# Patient Record
Sex: MALE | Race: WHITE | Employment: FULL TIME | URBAN - METROPOLITAN AREA
[De-identification: names, ages, dates, MRNs, and addresses within clinical notes are randomized per-mention and may not be internally consistent; named-entity substitution may affect disease eponyms.]

---

## 2017-03-28 ENCOUNTER — HOSPITAL ENCOUNTER (OUTPATIENT)
Age: 52
Setting detail: OBSERVATION
Discharge: HOME | End: 2017-03-29
Attending: EMERGENCY MEDICINE | Admitting: FAMILY MEDICINE
Payer: COMMERCIAL

## 2017-03-28 ENCOUNTER — APPOINTMENT (OUTPATIENT)
Dept: CT IMAGING | Age: 52
End: 2017-03-28
Attending: EMERGENCY MEDICINE
Payer: COMMERCIAL

## 2017-03-28 DIAGNOSIS — O22.30 DVT (DEEP VEIN THROMBOSIS) IN PREGNANCY: ICD-10-CM

## 2017-03-28 DIAGNOSIS — I26.99 OTHER ACUTE PULMONARY EMBOLISM: Primary | ICD-10-CM

## 2017-03-28 DIAGNOSIS — R77.8 ELEVATED TROPONIN: ICD-10-CM

## 2017-03-28 LAB
ALBUMIN SERPL BCP-MCNC: 4.4 G/DL (ref 3.5–5)
ALBUMIN/GLOB SERPL: 1.3 {RATIO} (ref 1.1–2.2)
ALP SERPL-CCNC: 81 U/L (ref 45–117)
ALT SERPL-CCNC: 27 U/L (ref 12–78)
ANION GAP BLD CALC-SCNC: 6 MMOL/L (ref 5–15)
AST SERPL W P-5'-P-CCNC: 19 U/L (ref 15–37)
ATRIAL RATE: 82 BPM
BASOPHILS # BLD AUTO: 0 K/UL (ref 0–0.1)
BASOPHILS # BLD: 0 % (ref 0–1)
BILIRUB SERPL-MCNC: 0.4 MG/DL (ref 0.2–1)
BUN SERPL-MCNC: 24 MG/DL (ref 6–20)
BUN/CREAT SERPL: 22 (ref 12–20)
CALCIUM SERPL-MCNC: 9 MG/DL (ref 8.5–10.1)
CALCULATED P AXIS, ECG09: 22 DEGREES
CALCULATED R AXIS, ECG10: 12 DEGREES
CALCULATED T AXIS, ECG11: 36 DEGREES
CHLORIDE SERPL-SCNC: 103 MMOL/L (ref 97–108)
CK MB CFR SERPL CALC: 1.7 % (ref 0–2.5)
CK MB SERPL-MCNC: 2.2 NG/ML (ref 5–25)
CK SERPL-CCNC: 127 U/L (ref 39–308)
CO2 SERPL-SCNC: 30 MMOL/L (ref 21–32)
CREAT SERPL-MCNC: 1.1 MG/DL (ref 0.7–1.3)
DIAGNOSIS, 93000: NORMAL
EOSINOPHIL # BLD: 0.2 K/UL (ref 0–0.4)
EOSINOPHIL NFR BLD: 3 % (ref 0–7)
ERYTHROCYTE [DISTWIDTH] IN BLOOD BY AUTOMATED COUNT: 12.5 % (ref 11.5–14.5)
GLOBULIN SER CALC-MCNC: 3.4 G/DL (ref 2–4)
GLUCOSE SERPL-MCNC: 91 MG/DL (ref 65–100)
HCT VFR BLD AUTO: 45 % (ref 36.6–50.3)
HGB BLD-MCNC: 15.3 G/DL (ref 12.1–17)
LYMPHOCYTES # BLD AUTO: 21 % (ref 12–49)
LYMPHOCYTES # BLD: 1.8 K/UL (ref 0.8–3.5)
MCH RBC QN AUTO: 31.2 PG (ref 26–34)
MCHC RBC AUTO-ENTMCNC: 34 G/DL (ref 30–36.5)
MCV RBC AUTO: 91.8 FL (ref 80–99)
MONOCYTES # BLD: 0.8 K/UL (ref 0–1)
MONOCYTES NFR BLD AUTO: 9 % (ref 5–13)
NEUTS SEG # BLD: 5.7 K/UL (ref 1.8–8)
NEUTS SEG NFR BLD AUTO: 67 % (ref 32–75)
P-R INTERVAL, ECG05: 166 MS
PLATELET # BLD AUTO: 120 K/UL (ref 150–400)
POTASSIUM SERPL-SCNC: 4.1 MMOL/L (ref 3.5–5.1)
PROT SERPL-MCNC: 7.8 G/DL (ref 6.4–8.2)
Q-T INTERVAL, ECG07: 382 MS
QRS DURATION, ECG06: 100 MS
QTC CALCULATION (BEZET), ECG08: 446 MS
RBC # BLD AUTO: 4.9 M/UL (ref 4.1–5.7)
SODIUM SERPL-SCNC: 139 MMOL/L (ref 136–145)
TROPONIN I SERPL-MCNC: 0.1 NG/ML
TROPONIN I SERPL-MCNC: 0.16 NG/ML
VENTRICULAR RATE, ECG03: 82 BPM
WBC # BLD AUTO: 8.6 K/UL (ref 4.1–11.1)

## 2017-03-28 PROCEDURE — 71275 CT ANGIOGRAPHY CHEST: CPT

## 2017-03-28 PROCEDURE — 99218 HC RM OBSERVATION: CPT

## 2017-03-28 PROCEDURE — 93971 EXTREMITY STUDY: CPT

## 2017-03-28 PROCEDURE — 36415 COLL VENOUS BLD VENIPUNCTURE: CPT | Performed by: FAMILY MEDICINE

## 2017-03-28 PROCEDURE — 93306 TTE W/DOPPLER COMPLETE: CPT

## 2017-03-28 PROCEDURE — 74011636320 HC RX REV CODE- 636/320: Performed by: EMERGENCY MEDICINE

## 2017-03-28 PROCEDURE — 85025 COMPLETE CBC W/AUTO DIFF WBC: CPT | Performed by: EMERGENCY MEDICINE

## 2017-03-28 PROCEDURE — 80053 COMPREHEN METABOLIC PANEL: CPT | Performed by: EMERGENCY MEDICINE

## 2017-03-28 PROCEDURE — 74011000258 HC RX REV CODE- 258: Performed by: EMERGENCY MEDICINE

## 2017-03-28 PROCEDURE — 96372 THER/PROPH/DIAG INJ SC/IM: CPT

## 2017-03-28 PROCEDURE — 82550 ASSAY OF CK (CPK): CPT | Performed by: EMERGENCY MEDICINE

## 2017-03-28 PROCEDURE — 74011250636 HC RX REV CODE- 250/636: Performed by: EMERGENCY MEDICINE

## 2017-03-28 PROCEDURE — 74011250636 HC RX REV CODE- 250/636: Performed by: FAMILY MEDICINE

## 2017-03-28 PROCEDURE — 84484 ASSAY OF TROPONIN QUANT: CPT | Performed by: EMERGENCY MEDICINE

## 2017-03-28 PROCEDURE — 93005 ELECTROCARDIOGRAM TRACING: CPT

## 2017-03-28 PROCEDURE — 96361 HYDRATE IV INFUSION ADD-ON: CPT

## 2017-03-28 PROCEDURE — 99282 EMERGENCY DEPT VISIT SF MDM: CPT

## 2017-03-28 PROCEDURE — 96360 HYDRATION IV INFUSION INIT: CPT

## 2017-03-28 RX ORDER — SODIUM CHLORIDE 0.9 % (FLUSH) 0.9 %
10 SYRINGE (ML) INJECTION
Status: COMPLETED | OUTPATIENT
Start: 2017-03-28 | End: 2017-03-28

## 2017-03-28 RX ORDER — FLUTICASONE PROPIONATE 50 MCG
2 SPRAY, SUSPENSION (ML) NASAL
COMMUNITY
End: 2018-06-04

## 2017-03-28 RX ORDER — SODIUM CHLORIDE 0.9 % (FLUSH) 0.9 %
5-10 SYRINGE (ML) INJECTION EVERY 8 HOURS
Status: DISCONTINUED | OUTPATIENT
Start: 2017-03-28 | End: 2017-03-29 | Stop reason: HOSPADM

## 2017-03-28 RX ORDER — SODIUM CHLORIDE 9 MG/ML
75 INJECTION, SOLUTION INTRAVENOUS CONTINUOUS
Status: DISCONTINUED | OUTPATIENT
Start: 2017-03-28 | End: 2017-03-29

## 2017-03-28 RX ORDER — ACETAMINOPHEN 325 MG/1
650 TABLET ORAL
Status: DISCONTINUED | OUTPATIENT
Start: 2017-03-28 | End: 2017-03-29 | Stop reason: HOSPADM

## 2017-03-28 RX ORDER — SODIUM CHLORIDE 0.9 % (FLUSH) 0.9 %
5-10 SYRINGE (ML) INJECTION AS NEEDED
Status: DISCONTINUED | OUTPATIENT
Start: 2017-03-28 | End: 2017-03-29 | Stop reason: HOSPADM

## 2017-03-28 RX ORDER — MORPHINE SULFATE 2 MG/ML
1 INJECTION, SOLUTION INTRAMUSCULAR; INTRAVENOUS
Status: DISCONTINUED | OUTPATIENT
Start: 2017-03-28 | End: 2017-03-29 | Stop reason: HOSPADM

## 2017-03-28 RX ORDER — ATORVASTATIN CALCIUM 40 MG/1
40 TABLET, FILM COATED ORAL DAILY
Status: DISCONTINUED | OUTPATIENT
Start: 2017-03-29 | End: 2017-03-29 | Stop reason: HOSPADM

## 2017-03-28 RX ORDER — SODIUM CHLORIDE 0.9 % (FLUSH) 0.9 %
10 SYRINGE (ML) INJECTION
Status: CANCELLED | OUTPATIENT
Start: 2017-03-28 | End: 2017-03-28

## 2017-03-28 RX ORDER — ENOXAPARIN SODIUM 100 MG/ML
1 INJECTION SUBCUTANEOUS
Status: COMPLETED | OUTPATIENT
Start: 2017-03-28 | End: 2017-03-28

## 2017-03-28 RX ORDER — ENOXAPARIN SODIUM 100 MG/ML
100 INJECTION SUBCUTANEOUS EVERY 12 HOURS
Status: DISCONTINUED | OUTPATIENT
Start: 2017-03-29 | End: 2017-03-29

## 2017-03-28 RX ORDER — GUAIFENESIN 100 MG/5ML
81 LIQUID (ML) ORAL DAILY
Status: DISCONTINUED | OUTPATIENT
Start: 2017-03-29 | End: 2017-03-29 | Stop reason: HOSPADM

## 2017-03-28 RX ORDER — FLUTICASONE PROPIONATE 50 MCG
2 SPRAY, SUSPENSION (ML) NASAL
Status: DISCONTINUED | OUTPATIENT
Start: 2017-03-28 | End: 2017-03-29 | Stop reason: HOSPADM

## 2017-03-28 RX ADMIN — Medication 10 ML: at 17:37

## 2017-03-28 RX ADMIN — Medication 10 ML: at 22:24

## 2017-03-28 RX ADMIN — ENOXAPARIN SODIUM 100 MG: 100 INJECTION SUBCUTANEOUS at 17:00

## 2017-03-28 RX ADMIN — SODIUM CHLORIDE 100 ML: 900 INJECTION, SOLUTION INTRAVENOUS at 17:38

## 2017-03-28 RX ADMIN — SODIUM CHLORIDE 75 ML/HR: 900 INJECTION, SOLUTION INTRAVENOUS at 19:47

## 2017-03-28 RX ADMIN — IOPAMIDOL 75 ML: 755 INJECTION, SOLUTION INTRAVENOUS at 17:37

## 2017-03-28 NOTE — ED TRIAGE NOTES
Left calf pain x 2 weeks, pt had ddimer done and it was elevated, +sob, +swelling to left lower leg, denies chest pain, +sob with exertion, denies fever, denies n/v

## 2017-03-28 NOTE — PROGRESS NOTES
TRANSFER - IN REPORT:    Verbal report received from Ferdinand Kennedy RN on [employer]  being received from ED for routine progression of care      Report consisted of patients Situation, Background, Assessment and   Recommendations(SBAR). Information from the following report(s) SBAR, Kardex, ED Summary, Intake/Output, MAR, Recent Results and Cardiac Rhythm NSR was reviewed with the receiving nurse. Opportunity for questions and clarification was provided.

## 2017-03-28 NOTE — ED NOTES
Patient in no acute distress, vital signs stable, patient c/o LLE calf pain, denies shortness of breath, remains on RA. On cardiac monitor, cont. Respirations equal and unlabored. Patient reports recent travel to [place], drove there one month ago.

## 2017-03-28 NOTE — ROUTINE PROCESS
TRANSFER - OUT REPORT:    Verbal report given to Milagro Langford RN(name) on [employer]  being transferred to 5 OBS(unit) for routine progression of care       Report consisted of patients Situation, Background, Assessment and   Recommendations(SBAR). Information from the following report(s) SBAR and Recent Results was reviewed with the receiving nurse. Lines:   Peripheral IV 03/28/17 Right Antecubital (Active)   Site Assessment Clean, dry, & intact 3/28/2017  2:29 PM   Phlebitis Assessment 0 3/28/2017  2:29 PM   Infiltration Assessment 0 3/28/2017  2:29 PM   Dressing Status Clean, dry, & intact 3/28/2017  2:29 PM   Dressing Type Transparent 3/28/2017  2:29 PM   Hub Color/Line Status Green;Capped;Flushed;Patent 3/28/2017  2:29 PM   Action Taken Blood drawn 3/28/2017  2:29 PM   Alcohol Cap Used Yes 3/28/2017  2:29 PM        Opportunity for questions and clarification was provided. Patient transported with:   Monitor  Tech    Update: Patient aware of plan of care, VSS, IV patent and capped, in no acute distress, all upon transfer to inpatient unit.

## 2017-03-28 NOTE — PROCEDURES
[facility]  *** FINAL REPORT ***    Name: Janeth Levine  MRN: [id number]  : [birth year] [address] 1965  HIS Order #: [id number]  [id number] [facility] Visit #: [id number]  Date: 28 Mar 2017    TYPE OF TEST: Peripheral Venous Testing    REASON FOR TEST  Limb swelling, Shortness of breath    Left Leg:-  Deep venous thrombosis:           Yes  Proximal extent of thrombus:      Distal Femoral  Superficial venous thrombosis:    No  Deep venous insufficiency:        Not examined  Superficial venous insufficiency: Not examined      INTERPRETATION/FINDINGS  PROCEDURE:  Color duplex ultrasound imaging of lower extremity veins. FINDINGS:       Right: The common femoral vein is patent and without evidence of  thrombus. Phasic flow is observed. This extremity was not otherwise  evaluated. Left:   Consistent with thrombosis involving the distal femoral,  popliteal, soleal, posterior tibial, peroneal and soleal veins as  demonstrated by vein non-compressibility, and by a narrowing or  occlusion of the flow channel on color Doppler imaging. The remaining  segments, common femoral, deep femoral, proximal and mid femoral and  great saphenous are patent and without evidence of thrombus;  each is  fully compressible and there is no narrowing of the flow channel on  color Doppler imaging. Phasic flow is observed in the common femoral  vein. IMPRESSION:  There is evidence of vein thrombosis, as described above. The ultrasound appearance is more consistent with an acute than a  chronic process. ADDITIONAL COMMENTS    I have personally reviewed the data relevant to the interpretation of  this  study.     TECHNOLOGIST: Tio Jones RVT  Signed: 2017 03:12 PM    PHYSICIAN: Birgit Samayoa MD  Signed: 2017 05:49 AM

## 2017-03-28 NOTE — PROGRESS NOTES
Admission Medication Reconciliation:    Information obtained from: Patient    Significant PMH/Disease States:   History reviewed. No pertinent past medical history. Chief Complaint for this Admission:  Leg pain/ SOB    Allergies:  Review of patient's allergies indicates no known allergies. Prior to Admission Medications:   Prior to Admission Medications   Prescriptions Last Dose Informant Patient Reported? Taking?   fluticasone (FLONASE) 50 mcg/actuation nasal spray 3/28/2017 at Unknown time  Yes Yes   Si Sprays by Both Nostrils route daily as needed for Rhinitis. Facility-Administered Medications: None         Comments/Recommendations: Patient just completed a course of cefuroxime yesterday for sinusitis. Added Flonase.

## 2017-03-28 NOTE — ED PROVIDER NOTES
HPI Comments: 45 y.o. male with past medical history significant for knee and shoulder surgery who presents with chief complaint of leg swelling. Pt reports left lower leg pain for the last couple of weeks, and that while climbing a set of stairs yesterday he felt lightheaded, had exertional SOB and palpitations and felt like he was going to pass out. He reports left leg swelling today. He states that yesterday's exercise was not more strenuous than normal. He notes that he drove to [place] one month ago, stopped once each way. He also notes that he finished an ABX course recently. Pt denies hx of DVT, weight changes, cough, wheezing, blood in stool, diarrhea, constipation, hematuria, abdominal pain. There are no other acute medical concerns at this time. Social hx: nonsmoker, works as a   Significant famhx: father had bladder CA. PCP: None    Note written by Tricia Oliveira, as dictated by Sabine Felix MD 2:45 PM      The history is provided by the patient. History reviewed. No pertinent past medical history. Past Surgical History:   Procedure Laterality Date    HX HERNIA REPAIR      HX OTHER SURGICAL      knee surgery, shoulder surgery          History reviewed. No pertinent family history. Social History     Social History    Marital status: SINGLE     Spouse name: N/A    Number of children: N/A    Years of education: N/A     Occupational History    Not on file. Social History Main Topics    Smoking status: Not on file    Smokeless tobacco: Not on file    Alcohol use Not on file    Drug use: Not on file    Sexual activity: Not on file     Other Topics Concern    Not on file     Social History Narrative    No narrative on file         ALLERGIES: Review of patient's allergies indicates no known allergies. Review of Systems   Constitutional: Negative for appetite change, chills and fever. HENT: Negative for congestion.     Eyes: Negative for visual disturbance. Respiratory: Positive for shortness of breath. Negative for cough, chest tightness and wheezing. Cardiovascular: Positive for palpitations. Negative for chest pain. Gastrointestinal: Negative for abdominal pain, blood in stool, constipation, diarrhea and vomiting. Genitourinary: Negative for difficulty urinating, dysuria, frequency and hematuria. Musculoskeletal: Positive for myalgias (left leg). Negative for joint swelling. Left leg swelling   Skin: Negative for rash. Neurological: Positive for light-headedness. Negative for speech difficulty and headaches. All other systems reviewed and are negative. Vitals:    03/28/17 1418 03/28/17 1806   BP: (!) 167/96 145/87   Pulse: (!) 101 88   Resp: 16 16   Temp: 98.1 °F (36.7 °C)    SpO2: 97% 95%   Weight: 101.3 kg (223 lb 4 oz)    Height: 6' 1\" (1.854 m)             Physical Exam   Constitutional: He is oriented to person, place, and time. He appears well-developed and well-nourished. No distress. HENT:   Head: Normocephalic and atraumatic. Nose: Nose normal.   Eyes: Conjunctivae are normal. Pupils are equal, round, and reactive to light. No scleral icterus. Neck: Normal range of motion. Neck supple. No JVD present. No tracheal deviation present. No thyromegaly present. Cardiovascular: Normal rate, regular rhythm and normal heart sounds. No murmur heard. Pulmonary/Chest: Effort normal and breath sounds normal. No respiratory distress. He has no wheezes. He has no rales. Clear lungs   Abdominal: Soft. Bowel sounds are normal. He exhibits no mass. There is no tenderness. There is no rebound and no guarding. Musculoskeletal: Normal range of motion. He exhibits no edema. Left lower leg: He exhibits swelling. Neurological: He is alert and oriented to person, place, and time. No cranial nerve deficit. Coordination normal.   Skin: Skin is warm and dry. No rash noted. He is not diaphoretic. No erythema. Psychiatric: He has a normal mood and affect. His behavior is normal.   Nursing note and vitals reviewed. Note written by Tricia Quintana, as dictated by Sera Goldberg MD 2:45 PM      MDM  Number of Diagnoses or Management Options  DVT (deep vein thrombosis) in pregnancy [facility]): new and requires workup  Elevated troponin: new and requires workup  Other acute pulmonary embolism [facility]): new and requires workup     Amount and/or Complexity of Data Reviewed  Clinical lab tests: ordered and reviewed  Tests in the radiology section of CPT®: ordered and reviewed  Tests in the medicine section of CPT®: ordered and reviewed  Discussion of test results with the performing providers: yes  Decide to obtain previous medical records or to obtain history from someone other than the patient: yes  Review and summarize past medical records: yes  Discuss the patient with other providers: yes  Independent visualization of images, tracings, or specimens: yes    Risk of Complications, Morbidity, and/or Mortality  Presenting problems: high  Diagnostic procedures: high  Management options: high  General comments: Total critical care was 100    Critical Care  Total time providing critical care:  minutes    Patient Progress  Patient progress: stable    ED Course       Procedures    PROGRESS NOTE:  3:09 PM  Will admit to hospitalist for arrhythmia monitoring and likely PE. PROGRESS NOTE:  4:10 PM  Pt has confirmed blood clot in the left leg, with an elevated troponin of 0.16. Will order lovenox. CONSULT NOTE:  4:32 PM Sera Goldberg MD spoke with Dr. Clement Ivy, Consult for Hospitalist.  Discussed available diagnostic tests and clinical findings. Dr. Raymon Mackey will evaluate pt for admission. PROGRESS NOTE:  6:16 PM  Pt has confirmed bilateral PE's by CTA.

## 2017-03-28 NOTE — IP AVS SNAPSHOT
[address] [address] 
[phone] Patient: Cindi Ortega MRN: [id number] JZ You are allergic to the following No active allergies Recent Documentation Height Weight BMI Smoking Status 1.854 m 101.3 kg 29.45 kg/m2 Never Assessed Emergency Contacts Name Discharge Info Relation Home Work Mobile Natalia Dumont About your hospitalization You were admitted on:  2017 You last received care in the:  [facility] 5 OBSERVATION You were discharged on:  2017 Unit phone number:  [phone] Why you were hospitalized Your primary diagnosis was:  Pulmonary Emboli (Hcc) Your diagnoses also included:  Dvt (Deep Vein Thrombosis) In Pregnancy (Hcc), Elevated Troponin Providers Seen During Your Hospitalizations Provider Role Specialty Primary office phone Dakota Crane MD Attending Provider Emergency Medicine [phone] Kellen Nash MD Attending Provider Hospitalist [phone] Angela Sky MD Attending Provider Internal Medicine [phone] Your Primary Care Physician (PCP) Primary Care Physician Office Phone Office Fax NONE ** None ** ** None ** Follow-up Information Follow up With Details Comments Contact Info Phil Heart MD On 4/3/2017 Monday, April 3 at 2 pm.  [address] [facility] [address] [place] [address] 
[phone] Your Appointments 2017  2:00 PM EDT New Patient with Phil Heart MD  
[address] [place] [address] Oncology at [address]) [address] [facility] [address] [place] [address]  
[phone] Current Discharge Medication List  
  
START taking these medications Dose & Instructions Dispensing Information Comments Morning Noon Evening Bedtime  
 apixaban 5 mg tablet Commonly known as:  Alexy Cabrera Your next dose is: Today Take 10mg (2 tabs) twice daily for 7 days then 5mg (1 tab) twice daily until further directed. Quantity:  72 Tab Refills:  0 CONTINUE these medications which have NOT CHANGED Dose & Instructions Dispensing Information Comments Morning Noon Evening Bedtime FLONASE 50 mcg/actuation nasal spray Generic drug:  fluticasone Your next dose is: Today Dose:  2 Spray 2 Sprays by Both Nostrils route daily as needed for Rhinitis. Refills:  0 Where to Get Your Medications Information on where to get these meds will be given to you by the nurse or doctor. ! Ask your nurse or doctor about these medications  
  apixaban 5 mg tablet Discharge Instructions Discharge Instructions PATIENT ID: Praful Moise MRN: [id number] YOB: 1965 DATE OF ADMISSION: 3/28/2017  4:45 PM   
DATE OF DISCHARGE: 3/29/2017 PRIMARY CARE PROVIDER: None ATTENDING PHYSICIAN: Bre Prieto MD 
DISCHARGING PROVIDER: Yuliya Garcia NP To contact this individual call [phone] and ask the  to page. If unavailable ask to be transferred the Adult Hospitalist Department. DISCHARGE DIAGNOSES Pulmonary Embolus, Lower extremity blood clot CONSULTATIONS: IP CONSULT TO HOSPITALIST 
 
PROCEDURES/SURGERIES: * No surgery found * PENDING TEST RESULTS:  
At the time of discharge the following test results are still pending:  none FOLLOW UP APPOINTMENTS:  
Follow-up Information Follow up With Details Comments Contact Info David Triana MD On 4/3/2017 Monday, April 3 at 2 pm.  200 [facility] [address] [place] [address] 
[phone] ADDITIONAL CARE RECOMMENDATIONS:  
1. We have started you on a medication called Eliquis for the blood clots in your lungs and leg.  Please note this is a blood thinner and increases your risk of bleeding. See below for more information to include common side effects. 2. We have set you up with an appointment with a hematologist for an outpatient coagulation work up. Please see above for appointment time on Monday. 3. Recommending you call to schedule to set yourself up with a primary care provider as soon as possible from the list provided to you. DIET: Resume previous ACTIVITY: Activity as tolerated WOUND CARE: none EQUIPMENT needed: none DISCHARGE MEDICATIONS: 
Apixaban (By mouth) Apixaban (a-PIX-a-ban) Treats and prevents blood clots. This medicine is a blood thinner. Brand Name(s):Eliquis There may be other brand names for this medicine. When This Medicine Should Not Be Used: This medicine is not right for everyone. Do not use it if you had an allergic reaction to apixaban or you have active bleeding. How to Use This Medicine:  
Tablet · Your doctor will tell you how much medicine to use. Do not use more than directed. · This medicine should come with a Medication Guide. Ask your pharmacist for a copy if you do not have one. · Missed dose: Take a dose as soon as you remember. If it is almost time for your next dose, wait until then and take a regular dose. Do not take extra medicine to make up for a missed dose. · Store the medicine in a closed container at room temperature, away from heat, moisture, and direct light. Drugs and Foods to Avoid: Ask your doctor or pharmacist before using any other medicine, including over-the-counter medicines, vitamins, and herbal products. · Some medicines can affect how apixaban works. Tell your doctor if you are using any of the following: ¨ Another blood thinner, such as clopidogrel, heparin, prasugrel, warfarin ¨ An NSAID pain or arthritis medicine, such as aspirin, diclofenac, ibuprofen, naproxen, celecoxib ¨ Depression medicine ¨ Infection medicine, such as clarithromycin, itraconazole, ketoconazole, rifampin, ritonavir ¨ Seizure medicine, such as carbamazepine or phenytoin ¨ Berta's wort Warnings While Using This Medicine: · Tell your doctor if you are pregnant or breastfeeding, or if you have kidney disease, liver disease, bleeding problems, or an artificial heart valve. · Do not stop using this medicine suddenly without asking your doctor. You might have a higher risk of stroke for a short time after you stop using this medicine. · This medicine increases your risk for bleeding that can become serious if not controlled. You may also bruise easily, and it may take longer than usual for bleeding to stop. · This medicine may increase your risk for blood clots in your spine or back if you undergo an epidural or spinal puncture. This could lead to paralysis. Tell your doctor if you ever had spine problems or back surgery. · Tell any doctor or dentist who treats you that you are using this medicine. With your doctor's supervision, you may need to stop using this medicine several days before you have surgery or medical tests. · Your doctor will do lab tests at regular visits to check on the effects of this medicine. Keep all appointments. · Keep all medicine out of the reach of children. Never share your medicine with anyone. Possible Side Effects While Using This Medicine:  
Call your doctor right away if you notice any of these side effects: · Allergic reaction: Itching or hives, swelling in your face or hands, swelling or tingling in your mouth or throat, chest tightness, trouble breathing · Change in how much or how often you urinate, red or pink urine · Chest pain, trouble breathing · Coughing up blood, vomiting blood or material that looks like coffee grounds · Numbness, tingling, or muscle weakness in your legs or feet · Red or black, tarry stools · Unusual bleeding, bruising, or weakness If you notice other side effects that you think are caused by this medicine, tell your doctor. Call your doctor for medical advice about side effects. You may report side effects to FDA at 7-508-NIY-1321 © 2016 [address] is for End User's use only and may not be sold, redistributed or otherwise used for commercial purposes. The above information is an  only. It is not intended as medical advice for individual conditions or treatments. Talk to your doctor, nurse or pharmacist before following any medical regimen to see if it is safe and effective for you. See Medication Reconciliation Form · It is important that you take the medication exactly as they are prescribed. · Keep your medication in the bottles provided by the pharmacist and keep a list of the medication names, dosages, and times to be taken in your wallet. · Do not take other medications without consulting your doctor. NOTIFY YOUR PHYSICIAN FOR ANY OF THE FOLLOWING:  
Fever over 101 degrees for 24 hours. Chest pain, shortness of breath, fever, chills, nausea, vomiting, diarrhea, change in mentation, falling, weakness, bleeding. Severe pain or pain not relieved by medications. Or, any other signs or symptoms that you may have questions about. DISPOSITION: 
X  Home With: 
 OT  PT  [facility]  RN  
  
 SNF/Inpatient Rehab/LTAC Independent/assisted living Hospice Other: PROBLEM LIST Updated: Yes X Signed:  
Jitendra Jung NP 
3/29/2017 11:48 AM 
 
 
  
Learning About Deep Vein Thrombosis What is deep vein thrombosis? A deep vein thrombosis (DVT) is a blood clot in certain veins of the legs, pelvis, or arms. The clot is usually in the legs. DVT may damage the vein and cause the area to ache, swell, and change color. DVT also can lead to sores. DVT in these veins needs to be treated because the clots can get bigger, break loose, and travel through the bloodstream to the lungs. A blood clot in a lung can cause death. Blood clots can form in the veins when you are not active for a long period of time. For example, they can form if you need to stay in bed because of a health problem or must sit for a long time on an airplane or in a car. Surgery or an injury can damage your blood vessels and cause a clot to form. Cancer also can cause DVT. And some people have blood that clots too easily, which is a problem that may run in families. A risk factor is something that makes you more likely to develop a disease. Here are some major risk factors for DVT: 
· You have surgery. · You have to stay in bed for more than 3 days (such as in the hospital). · Your blood is likely to clot because of an injury, cancer, or inherited condition. Here are some minor risk factors for DVT: 
· You take birth control hormones. · You are pregnant. · You are in a car or airplane for a long trip. What are the symptoms? Symptoms of DVT may include: · Swelling in the affected area. · Redness and warmth in the affected area. · Pain or tenderness. You may have pain only when you touch the affected area or when you stand or walk. If your doctor thinks you may have DVT, you will probably have an ultrasound test. You may have other tests as well. How can you prevent DVT? · Exercise your lower leg muscles to help blood flow in your legs. Point your toes up toward your head so the calves of your legs are stretched, then relax and repeat. This is a good exercise to do when you are sitting for long periods of time. · Get out of bed as soon as you can after an illness or surgery. If you need to stay in bed, do the leg exercise noted above every hour when you are awake. · Use special stockings called compression stockings. These stockings are tight at the feet with a gradually looser fit on the leg. Many doctors recommend that you wear compression stockings during a journey longer than 8 hours. · Take breaks when you are on long trips. Stop the car and walk around. On long airplane flights, walk up and down the aisle hourly, flex and point your feet every 20 minutes while sitting, and drink plenty of water. · Take blood-thinning medicines after some types of surgery if your doctor recommends it. Blood thinners also may be used if you are likely to develop clots. How is DVT treated? Treatment for DVT usually involves taking blood thinners. These medicines are given through a vein (intravenously, or IV) or as a pill. Talk with your doctor about which medicine is right for you. Your doctor also may suggest that you prop up or elevate your leg when possible, take walks, and wear compression stockings. These measures may help reduce the pain and swelling that can happen with DVT. Follow-up care is a key part of your treatment and safety. Be sure to make and go to all appointments, and call your doctor if you are having problems. It's also a good idea to know your test results and keep a list of the medicines you take. Where can you learn more? Go to http://rc-nabila.info/. Enter A459 in the search box to learn more about \"Learning About Deep Vein Thrombosis. \" Current as of: June 4, 2016 Content Version: 11.2 © 4485-4647 [employer]. Care instructions adapted under license by [employer] (which disclaims liability or warranty for this information). If you have questions about a medical condition or this instruction, always ask your healthcare professional. [address] [place] [address] any warranty or liability for your use of this information. Discharge Orders None [facility] Announcement We are excited to announce that we are making your provider's discharge notes available to you in [employer].   You will see these notes when they are completed and signed by the physician that discharged you from your recent hospital stay. If you have any questions or concerns about any information you see in Redbeacon, please call the Health Information Department where you were seen or reach out to your Primary Care Provider for more information about your plan of care. Introducing [facility]! Cadence Alcantara introduces Redbeacon patient portal. Now you can access parts of your medical record, email your doctor's office, and request medication refills online. 1. In your internet browser, go to [email or web address] 2. Click on the First Time User? Click Here link in the Sign In box. You will see the New Member Sign Up page. 3. Enter your Redbeacon Access Code exactly as it appears below. You will not need to use this code after youve completed the sign-up process. If you do not sign up before the expiration date, you must request a new code. · Redbeacon Access Code: [email or web address] Expires: 6/26/2017  2:50 PM 
 
4. Enter the last four digits of your Social Security Number (xxxx) and Date of Birth (mm/dd/yyyy) as indicated and click Submit. You will be taken to the next sign-up page. 5. Create a Redbeacon ID. This will be your Redbeacon login ID and cannot be changed, so think of one that is secure and easy to remember. 6. Create a Redbeacon password. You can change your password at any time. 7. Enter your Password Reset Question and Answer. This can be used at a later time if you forget your password. 8. Enter your e-mail address. You will receive e-mail notification when new information is available in [address] 9. Click Sign Up. You can now view and download portions of your medical record. 10. Click the Download Summary menu link to download a portable copy of your medical information. If you have questions, please visit the Frequently Asked Questions section of the Redbeacon website. Remember, Redbeacon is NOT to be used for urgent needs. For medical emergencies, dial 911. Now available from your iPhone and Android! General Information Please provide this summary of care documentation to your next provider. Patient Signature:  ____________________________________________________________ Date:  ____________________________________________________________  
  
Burlene Matteo Provider Signature:  ____________________________________________________________ Date:  ____________________________________________________________

## 2017-03-28 NOTE — IP AVS SNAPSHOT
Current Discharge Medication List  
  
START taking these medications Dose & Instructions Dispensing Information Comments Morning Noon Evening Bedtime  
 apixaban 5 mg tablet Commonly known as:  Timmothy Caro Your next dose is: Today Take 10mg (2 tabs) twice daily for 7 days then 5mg (1 tab) twice daily until further directed. Quantity:  72 Tab Refills:  0 CONTINUE these medications which have NOT CHANGED Dose & Instructions Dispensing Information Comments Morning Noon Evening Bedtime FLONASE 50 mcg/actuation nasal spray Generic drug:  fluticasone Your next dose is: Today Dose:  2 Spray 2 Sprays by Both Nostrils route daily as needed for Rhinitis. Refills:  0 Where to Get Your Medications Information on where to get these meds will be given to you by the nurse or doctor. ! Ask your nurse or doctor about these medications  
  apixaban 5 mg tablet

## 2017-03-29 VITALS
WEIGHT: 223.25 LBS | TEMPERATURE: 98.2 F | HEIGHT: 73 IN | SYSTOLIC BLOOD PRESSURE: 134 MMHG | OXYGEN SATURATION: 95 % | HEART RATE: 81 BPM | RESPIRATION RATE: 20 BRPM | BODY MASS INDEX: 29.59 KG/M2 | DIASTOLIC BLOOD PRESSURE: 72 MMHG

## 2017-03-29 LAB
ERYTHROCYTE [DISTWIDTH] IN BLOOD BY AUTOMATED COUNT: 12.4 % (ref 11.5–14.5)
HCT VFR BLD AUTO: 41.8 % (ref 36.6–50.3)
HGB BLD-MCNC: 14.3 G/DL (ref 12.1–17)
MCH RBC QN AUTO: 31.2 PG (ref 26–34)
MCHC RBC AUTO-ENTMCNC: 34.2 G/DL (ref 30–36.5)
MCV RBC AUTO: 91.3 FL (ref 80–99)
PLATELET # BLD AUTO: 126 K/UL (ref 150–400)
RBC # BLD AUTO: 4.58 M/UL (ref 4.1–5.7)
TROPONIN I SERPL-MCNC: 0.06 NG/ML
TROPONIN I SERPL-MCNC: <0.04 NG/ML
WBC # BLD AUTO: 7 K/UL (ref 4.1–11.1)

## 2017-03-29 PROCEDURE — 85027 COMPLETE CBC AUTOMATED: CPT | Performed by: FAMILY MEDICINE

## 2017-03-29 PROCEDURE — 74011250636 HC RX REV CODE- 250/636: Performed by: FAMILY MEDICINE

## 2017-03-29 PROCEDURE — 99218 HC RM OBSERVATION: CPT

## 2017-03-29 PROCEDURE — 74011250637 HC RX REV CODE- 250/637: Performed by: FAMILY MEDICINE

## 2017-03-29 PROCEDURE — 96372 THER/PROPH/DIAG INJ SC/IM: CPT

## 2017-03-29 PROCEDURE — 84484 ASSAY OF TROPONIN QUANT: CPT | Performed by: FAMILY MEDICINE

## 2017-03-29 PROCEDURE — 96361 HYDRATE IV INFUSION ADD-ON: CPT

## 2017-03-29 PROCEDURE — 36415 COLL VENOUS BLD VENIPUNCTURE: CPT | Performed by: FAMILY MEDICINE

## 2017-03-29 RX ADMIN — ENOXAPARIN SODIUM 100 MG: 40 INJECTION SUBCUTANEOUS at 04:45

## 2017-03-29 RX ADMIN — Medication 10 ML: at 04:46

## 2017-03-29 RX ADMIN — SODIUM CHLORIDE 75 ML/HR: 900 INJECTION, SOLUTION INTRAVENOUS at 09:09

## 2017-03-29 RX ADMIN — ASPIRIN 81 MG: 81 TABLET, CHEWABLE ORAL at 09:07

## 2017-03-29 RX ADMIN — SODIUM CHLORIDE 75 ML/HR: 900 INJECTION, SOLUTION INTRAVENOUS at 09:08

## 2017-03-29 NOTE — PROGRESS NOTES
Bedside verbal shift change report given to oncoming nurse Esmeralda Schlatter, R.N. Opportunity for questions and clarifications provided.

## 2017-03-29 NOTE — H&P
[address] [place] [facility] [place] [address]   HISTORY AND PHYSICAL       Name:  Rosmery Luciano   MR#:  [id number]   :  1965   Account #:  [de-identified]        Date of Adm:  2017       CHIEF COMPLAINT: Shortness of breath. HISTORY OF PRESENT ILLNESS: The patient is a 79-year-old   gentleman with past medical history of allergic rhinitis and knee and   shoulder surgery. The patient presents to the hospital complaining of   left leg swelling. The patient reports that he started developing left leg   swelling about 2 weeks back, but did not pay much attention, thought it   was \"muscle strain. \" But yesterday, the patient started having some   pain and discomfort in the leg. The patient also reports yesterday he   was climbing stairs and after reaching the top of the stairs, he felt   extremely dizzy and as if he going to pass out. He felt \"his heart   racing. \" The patient got concerned and decided to come to the   hospital. The patient reports that he drove to [place] about a month   ago, stopped one time each way, and came back to [place]. The   patient reports that he has tinnitus and had some sinus infection for   which he just recently completed antibiotic course. The patient denies   any other complaints or problems. The patient denies any headache,   blurry vision, sore throat, trouble swallowing, trouble with speech, any   abdominal pain, constipation, diarrhea, fever, chills, hematemesis,   melena, hemoptysis, urinary symptoms, focal or generalized   neurological weakness, recent travels or history of blood clots. PAST MEDICAL HISTORY: See above. HOME MEDICATIONS: Currently Flonase. REVIEW OF SYSTEMS: A 10-point review of systems done, which   was essentially negative except for the symptoms mentioned above. ALLERGIES: NO KNOWN DRUG ALLERGIES. FAMILY HISTORY: Was discussed, was found to be noncontributory.    No history of blood clots in the family. PHYSICAL EXAMINATION   VITAL SIGNS: Temperature 98.1, pulse 88, respiratory rate 16, blood   pressure 145/87, pulse oximetry 95% on room air. GENERAL: Alert and oriented x3, awake, no acute distress, resting in   bed, pleasant male, appears to be stated age. HEENT: Pupils equal and reactive to light. Dry mucous membranes. Tympanic membranes clear. NECK: NECK: Supple. CHEST: Clear to auscultation bilaterally. CORONARY: S1, S2 were heard. ABDOMEN: Soft, nontender, nondistended. Bowel sounds are   physiological.   EXTREMITIES: No clubbing, no cyanosis. The left extremity appears   swollen, tender to palpation and Homans sign is positive. NEUROPSYCHIATRIC: Pleasant mood and affect. Sensory grossly   within normal limits. DTR 2+. Strength 5/5. SKIN: Warm. LABORATORY DATA: White count 8.6, hemoglobin 15.3, hematocrit   45, platelets 174. Sodium 139, potassium 4.1, chloride 103,   bicarbonate 30, BUN 24, creatinine 1.10, calcium 9. ALT 27, AST 19,   alkaline phosphatase 81. , CK-MB 2.2. Troponin 0.16. CT of the chest shows bilateral pulmonary emboli. Left lower extremity shows acute left DVT. EKG shows normal sinus rhythm. ASSESSMENT AND PLAN   1. Bilateral pulmonary emboli. The patient will be observed on a   telemetry bed. Will start the patient on Lovenox 1 mg/kg subcutaneous   and continuous pulse oximetry monitoring and oxygen support, and   continue to closely monitor. Will get an echocardiogram, has been   ordered. Further intervention will be per hospital course. Reassess as   needed. The patient may need outpatient hypercoagulable workup. Continue to monitor. 2. Elevated troponin, unclear etiology, most likely secondary to right   heart strain, post pulmonary embolism. Will continue the patient on   aspirin and statin. We will get cardiac enzymes x3 sets. The patient   denies any chest pain at this point of time.  May consider, once echo   results are available, may consider getting a cardiology consult in the   morning, and further intervention will be per hospital course. Will   reassess as needed. The patient will be on telemetry. 3. Left lower extremity deep venous thrombosis. The patient on   Lovenox, pain control. Continue to monitor. 4. Gastrointestinal and deep venous thrombosis prophylaxis. The   patient is on Lovenox. Gastrointestinal prophylaxis not indicated.         MD Yocasta Virk / Sriram Oneal   D:  03/28/2017   18:30   T:  03/28/2017   20:41   Job #:  [id number]

## 2017-03-29 NOTE — DISCHARGE INSTRUCTIONS
Discharge Instructions       PATIENT ID: Rupali Sapp  MRN: [id number]   YOB: 1965    DATE OF ADMISSION: 3/28/2017  4:45 PM    DATE OF DISCHARGE: 3/29/2017    PRIMARY CARE PROVIDER: None     ATTENDING PHYSICIAN: Kiran Gavin MD  DISCHARGING PROVIDER: Sherren Broker, NP    To contact this individual call [phone] and ask the  to page. If unavailable ask to be transferred the Adult Hospitalist Department. DISCHARGE DIAGNOSES Pulmonary Embolus, Lower extremity blood clot     CONSULTATIONS: IP CONSULT TO HOSPITALIST    PROCEDURES/SURGERIES: * No surgery found *    PENDING TEST RESULTS:   At the time of discharge the following test results are still pending:  none    FOLLOW UP APPOINTMENTS:   Follow-up Information     Follow up With Details Comments Contact Info    Edwin Gaviria MD On 4/3/2017 Monday, April 3 at 2 pm.  [facility]  [address]             ADDITIONAL CARE RECOMMENDATIONS:   1. We have started you on a medication called Eliquis for the blood clots in your lungs and leg. Please note this is a blood thinner and increases your risk of bleeding. See below for more information to include common side effects. 2. We have set you up with an appointment with a hematologist for an outpatient coagulation work up. Please see above for appointment time on Monday. 3. Recommending you call to schedule to set yourself up with a primary care provider as soon as possible from the list provided to you. DIET: Resume previous     ACTIVITY: Activity as tolerated    WOUND CARE: none    EQUIPMENT needed: none      DISCHARGE MEDICATIONS:  Apixaban (By mouth)   Apixaban (a-PIX-a-ban)  Treats and prevents blood clots. This medicine is a blood thinner. Brand Name(s):Eliquis   There may be other brand names for this medicine. When This Medicine Should Not Be Used: This medicine is not right for everyone.  Do not use it if you had an allergic reaction to apixaban or you have active bleeding. How to Use This Medicine:   Tablet  · Your doctor will tell you how much medicine to use. Do not use more than directed. · This medicine should come with a Medication Guide. Ask your pharmacist for a copy if you do not have one. · Missed dose: Take a dose as soon as you remember. If it is almost time for your next dose, wait until then and take a regular dose. Do not take extra medicine to make up for a missed dose. · Store the medicine in a closed container at room temperature, away from heat, moisture, and direct light. Drugs and Foods to Avoid:   Ask your doctor or pharmacist before using any other medicine, including over-the-counter medicines, vitamins, and herbal products. · Some medicines can affect how apixaban works. Tell your doctor if you are using any of the following:   ¨ Another blood thinner, such as clopidogrel, heparin, prasugrel, warfarin  ¨ An NSAID pain or arthritis medicine, such as aspirin, diclofenac, ibuprofen, naproxen, celecoxib  ¨ Depression medicine  ¨ Infection medicine, such as clarithromycin, itraconazole, ketoconazole, rifampin, ritonavir  ¨ Seizure medicine, such as carbamazepine or phenytoin  ¨ Berta's wort  Warnings While Using This Medicine:   · Tell your doctor if you are pregnant or breastfeeding, or if you have kidney disease, liver disease, bleeding problems, or an artificial heart valve. · Do not stop using this medicine suddenly without asking your doctor. You might have a higher risk of stroke for a short time after you stop using this medicine. · This medicine increases your risk for bleeding that can become serious if not controlled. You may also bruise easily, and it may take longer than usual for bleeding to stop. · This medicine may increase your risk for blood clots in your spine or back if you undergo an epidural or spinal puncture. This could lead to paralysis.  Tell your doctor if you ever had spine problems or back surgery. · Tell any doctor or dentist who treats you that you are using this medicine. With your doctor's supervision, you may need to stop using this medicine several days before you have surgery or medical tests. · Your doctor will do lab tests at regular visits to check on the effects of this medicine. Keep all appointments. · Keep all medicine out of the reach of children. Never share your medicine with anyone. Possible Side Effects While Using This Medicine:   Call your doctor right away if you notice any of these side effects:  · Allergic reaction: Itching or hives, swelling in your face or hands, swelling or tingling in your mouth or throat, chest tightness, trouble breathing  · Change in how much or how often you urinate, red or pink urine  · Chest pain, trouble breathing  · Coughing up blood, vomiting blood or material that looks like coffee grounds  · Numbness, tingling, or muscle weakness in your legs or feet  · Red or black, tarry stools  · Unusual bleeding, bruising, or weakness  If you notice other side effects that you think are caused by this medicine, tell your doctor. Call your doctor for medical advice about side effects. You may report side effects to FDA at 8-650-FDA-9499  © 2016 [address] is for End User's use only and may not be sold, redistributed or otherwise used for commercial purposes. The above information is an  only. It is not intended as medical advice for individual conditions or treatments. Talk to your doctor, nurse or pharmacist before following any medical regimen to see if it is safe and effective for you. See Medication Reconciliation Form    · It is important that you take the medication exactly as they are prescribed. · Keep your medication in the bottles provided by the pharmacist and keep a list of the medication names, dosages, and times to be taken in your wallet.    · Do not take other medications without consulting your doctor. NOTIFY YOUR PHYSICIAN FOR ANY OF THE FOLLOWING:   Fever over 101 degrees for 24 hours. Chest pain, shortness of breath, fever, chills, nausea, vomiting, diarrhea, change in mentation, falling, weakness, bleeding. Severe pain or pain not relieved by medications. Or, any other signs or symptoms that you may have questions about. DISPOSITION:  X  Home With:   OT  PT  HH  RN       SNF/Inpatient Rehab/LTAC    Independent/assisted living    Hospice    Other:       PROBLEM LIST Updated: Yes X       Signed:   Doyle Azevedo NP  3/29/2017  11:48 AM         Learning About Deep Vein Thrombosis  What is deep vein thrombosis? A deep vein thrombosis (DVT) is a blood clot in certain veins of the legs, pelvis, or arms. The clot is usually in the legs. DVT may damage the vein and cause the area to ache, swell, and change color. DVT also can lead to sores. DVT in these veins needs to be treated because the clots can get bigger, break loose, and travel through the bloodstream to the lungs. A blood clot in a lung can cause death. Blood clots can form in the veins when you are not active for a long period of time. For example, they can form if you need to stay in bed because of a health problem or must sit for a long time on an airplane or in a car. Surgery or an injury can damage your blood vessels and cause a clot to form. Cancer also can cause DVT. And some people have blood that clots too easily, which is a problem that may run in families. A risk factor is something that makes you more likely to develop a disease. Here are some major risk factors for DVT:  · You have surgery. · You have to stay in bed for more than 3 days (such as in the hospital). · Your blood is likely to clot because of an injury, cancer, or inherited condition. Here are some minor risk factors for DVT:  · You take birth control hormones. · You are pregnant.   · You are in a car or airplane for a long trip.  What are the symptoms? Symptoms of DVT may include:  · Swelling in the affected area. · Redness and warmth in the affected area. · Pain or tenderness. You may have pain only when you touch the affected area or when you stand or walk. If your doctor thinks you may have DVT, you will probably have an ultrasound test. You may have other tests as well. How can you prevent DVT? · Exercise your lower leg muscles to help blood flow in your legs. Point your toes up toward your head so the calves of your legs are stretched, then relax and repeat. This is a good exercise to do when you are sitting for long periods of time. · Get out of bed as soon as you can after an illness or surgery. If you need to stay in bed, do the leg exercise noted above every hour when you are awake. · Use special stockings called compression stockings. These stockings are tight at the feet with a gradually looser fit on the leg. Many doctors recommend that you wear compression stockings during a journey longer than 8 hours. · Take breaks when you are on long trips. Stop the car and walk around. On long airplane flights, walk up and down the aisle hourly, flex and point your feet every 20 minutes while sitting, and drink plenty of water. · Take blood-thinning medicines after some types of surgery if your doctor recommends it. Blood thinners also may be used if you are likely to develop clots. How is DVT treated? Treatment for DVT usually involves taking blood thinners. These medicines are given through a vein (intravenously, or IV) or as a pill. Talk with your doctor about which medicine is right for you. Your doctor also may suggest that you prop up or elevate your leg when possible, take walks, and wear compression stockings. These measures may help reduce the pain and swelling that can happen with DVT. Follow-up care is a key part of your treatment and safety.  Be sure to make and go to all appointments, and call your doctor if you are having problems. It's also a good idea to know your test results and keep a list of the medicines you take. Where can you learn more? Go to http://rc-nabila.info/. Enter O004 in the search box to learn more about \"Learning About Deep Vein Thrombosis. \"  Current as of: June 4, 2016  Content Version: 11.2  © 1966-6169 [employer]. Care instructions adapted under license by [facility] (which disclaims liability or warranty for this information). If you have questions about a medical condition or this instruction, always ask your healthcare professional. [place] [address] any warranty or liability for your use of this information.

## 2017-03-29 NOTE — PROGRESS NOTES
Patient is resting in bed he denies having any pain, SOB or discomfort at this time. His left lower calf still has some swelling but no shininess or tightness. His lungs are clear and he is moving air throughout. Will continue to monitor patient for changes in his condition. 0000 patient is resting in bed no complaints at this time. No change in LLE no pain or tenderness at this time. 0430 patient is resting in bed no complaints of pain or discomfort at this time. 0600 patient is resting in bed.    0730 Bedside and Verbal shift change report given to Anitha Mccall RN  (oncoming nurse) by Yelena Galloway RN  (offgoing nurse). Report included the following information SBAR, MAR, Recent Results and Med Rec Status.

## 2017-03-29 NOTE — PROGRESS NOTES
Received consult to determine cost of Eliquis. Pt has [employer] which should have prescription coverage. Would suggest using Outpatient pharmacy at discharge to ensure insurance coverage and address any barriers if exist.    ESEQUIEL Dodson    Provided pt with Eliquis free 30 day card and updated regarding availability of outpt pharmacy. Pt has follow up appointment scheduled on Monday, Dr. Eneida Bolivar and he is exploring new PCP's as well.     ESEQUIEL Katz

## 2017-03-29 NOTE — DISCHARGE SUMMARY
Discharge Summary       PATIENT ID: Asmita Pacheco  MRN: [id number]   YOB: 1965    DATE OF ADMISSION: 3/28/2017  4:45 PM    DATE OF DISCHARGE: 3/29/2017  PRIMARY CARE PROVIDER: None     ATTENDING PHYSICIAN: Vesta Escalera  DISCHARGING PROVIDER: Jitendra Jung NP    To contact this individual call [phone] and ask the  to page. If unavailable ask to be transferred the Adult Hospitalist Department. CONSULTATIONS: IP CONSULT TO HOSPITALIST    PROCEDURES/SURGERIES: * No surgery found *    ADMITTING DIAGNOSES & HOSPITAL COURSE:   Dx: Ameya PE, LLE DVT    45 yom with pmhl of allergic rhinitis and knee and shoulder surgery who presented with left leg swelling. Pt notes swelling and pain started about 2 weeks ago. Patient also reported dizziness with activity. CTA chest + bilateral PE. Dopplers + for LLE DVT. Pt denies any h/o of blood clots or family h/o clotting disorder. Pt notes about 4 weeks ago he drove to [place] and did not get out of the car besides every 5 hours. Echo showed no evidence of heart strain. Patient stable to discharge home. DISCHARGE DIAGNOSES / PLAN:      Bilateral PE with LLE DVT   -lovenox-> changed to Eliquis   -discussed pro and cons of both novel anticoagulants and coumadin, pt choose Eliquis  -pt does not have pcp, list provided for OP follow up   -pt scheduled for appointment with Dr Melanie Lynn on Monday with hematology for OP coag work up    Mildly Elevated Troponin  -0.16 on admission, now 0.04  -denies any chest pain   -echo normal   --EKG NSR       PENDING TEST RESULTS:   At the time of discharge the following test results are still pending: none    FOLLOW UP APPOINTMENTS:    Follow-up Information     Follow up With Details Comments [address], [place] On 4/3/2017 Monday, April 3 at 2 pm.  [address] [place] [address] [place]  [address]  [phone]           ADDITIONAL CARE RECOMMENDATIONS:   1.  We have started you on a medication called Eliquis for the blood clots in your lungs and leg. Please note this is a blood thinner and increases your risk of bleeding. See below for more information to include common side effects. 2. We have set you up with an appointment with a hematologist for an outpatient coagulation work up. Please see above for appointment time on Monday. 3. Recommending you call to schedule to set yourself up with a primary care provider as soon as possible from the list provided to you. DIET: Resume previous     ACTIVITY: Activity as tolerated    WOUND CARE: none    EQUIPMENT needed: none      DISCHARGE MEDICATIONS:  Discharge Medication List as of 3/29/2017 12:05 PM      START taking these medications    Details   apixaban (ELIQUIS) 5 mg tablet Take 10mg (2 tabs) twice daily for 7 days then 5mg (1 tab) twice daily until further directed. , Print, Disp-72 Tab, R-0         CONTINUE these medications which have NOT CHANGED    Details   fluticasone (FLONASE) 50 mcg/actuation nasal spray 2 Sprays by Both Nostrils route daily as needed for Rhinitis., Historical Med               NOTIFY YOUR PHYSICIAN FOR ANY OF THE FOLLOWING:   Fever over 101 degrees for 24 hours. Chest pain, shortness of breath, fever, chills, nausea, vomiting, diarrhea, change in mentation, falling, weakness, bleeding. Severe pain or pain not relieved by medications. Or, any other signs or symptoms that you may have questions about. DISPOSITION:  X  Home With:   OT  PT  HH  RN       Long term SNF/Inpatient Rehab    Independent/assisted living    Hospice    Other:       PATIENT CONDITION AT DISCHARGE:     Functional status    Poor     Deconditioned    X Independent      Cognition    X Lucid     Forgetful     Dementia      Catheters/lines (plus indication)    Nicolas     PICC     PEG    X None      Code status   X  Full code     DNR      PHYSICAL EXAMINATION AT DISCHARGE:  General: No acute distress, cooperative, pleasant   EENT: EOMI. Anicteric sclerae.  Oral mucous moist, oropharynx benign  Resp: CTA bilaterally. No wheezing/rhonchi/rales. No accessory muscle use  CV: Regular rhythm, normal rate, no murmurs, gallops, rubs  GI: Soft, non distended, non tender. normoactive bowel sounds,   Extremities: No edema, warm, 2+ pulses throughout  Neurologic: Moves all extremities. AAOx3, CN II-XII grossly intact  Psych: Good insight. Not anxious nor agitated.   Skin: Good Turgor, no rashes or ulcers      CHRONIC MEDICAL DIAGNOSES:  Problem List as of 3/29/2017  Date Reviewed: 3/29/2017          Codes Class Noted - Resolved    * (Principal)Pulmonary emboli ([address] ICD-10-CM: I26.99  ICD-9-CM: 415.19  3/28/2017 - Present        DVT (deep vein thrombosis) in pregnancy [facility]) ICD-10-CM: O22.30, I82.409  ICD-9-CM: 671.30  3/28/2017 - Present        Elevated troponin ICD-10-CM: R79.89  ICD-9-CM: 790.6  3/28/2017 - Present              Greater than 30 minutes were spent with the patient on counseling and coordination of care    Signed:   Aung Hubbard NP  3/29/2017  11:52 AM

## 2017-04-03 ENCOUNTER — OFFICE VISIT (OUTPATIENT)
Dept: ONCOLOGY | Age: 52
End: 2017-04-03

## 2017-04-03 VITALS
OXYGEN SATURATION: 93 % | RESPIRATION RATE: 18 BRPM | WEIGHT: 223.4 LBS | HEART RATE: 68 BPM | TEMPERATURE: 97.8 F | HEIGHT: 73 IN | DIASTOLIC BLOOD PRESSURE: 90 MMHG | SYSTOLIC BLOOD PRESSURE: 132 MMHG | BODY MASS INDEX: 29.61 KG/M2

## 2017-04-03 DIAGNOSIS — I26.99 OTHER ACUTE PULMONARY EMBOLISM: Primary | ICD-10-CM

## 2017-04-03 DIAGNOSIS — O22.30 DVT (DEEP VEIN THROMBOSIS) IN PREGNANCY: ICD-10-CM

## 2017-04-03 DIAGNOSIS — D69.6 THROMBOCYTOPENIA (HCC): ICD-10-CM

## 2017-04-03 RX ORDER — HYDROCORTISONE 25 MG/G
CREAM TOPICAL 4 TIMES DAILY
Qty: 30 G | Refills: 0 | Status: SHIPPED | OUTPATIENT
Start: 2017-04-03 | End: 2018-06-04

## 2017-04-03 RX ORDER — CEFUROXIME AXETIL 250 MG/1
TABLET ORAL
COMMUNITY
Start: 2017-03-17 | End: 2017-04-03 | Stop reason: ALTCHOICE

## 2017-04-03 NOTE — MR AVS SNAPSHOT
Visit Information Date & Time Provider Department Dept. Phone Encounter #  
 4/3/2017  2:00 PM Disha Holguin  [facility] at [address] [id number] Upcoming Health Maintenance Date Due Hepatitis C Screening 1965 DTaP/Tdap/Td series (1 - Tdap) 6/15/1985 FOBT Q 1 YEAR AGE 50-75 6/15/2014 INFLUENZA AGE 9 TO ADULT 8/1/2016 Allergies as of 4/3/2017  Review Complete On: 4/3/2017 By: Negra Prater LPN No Known Allergies Current Immunizations  Never Reviewed No immunizations on file. Not reviewed this visit Vitals BP Pulse Temp Resp Height(growth percentile) Weight(growth percentile) 132/90 (BP 1 Location: Left arm, BP Patient Position: Sitting) 68 97.8 °F (36.6 °C) (Oral) 18 6' 1\" (1.854 m) 223 lb 6.4 oz (101.3 kg) SpO2 BMI Smoking Status 93% 29.47 kg/m2 Never Smoker Vitals History BMI and BSA Data Body Mass Index Body Surface Area  
 29.47 kg/m 2 2.28 m 2 Preferred Pharmacy Pharmacy Name Phone [facility] [place] [address] [address] [phone] Your Updated Medication List  
  
   
This list is accurate as of: 4/3/17  2:48 PM.  Always use your most recent med list.  
  
  
  
  
 apixaban 5 mg tablet Commonly known as:  Guerrero Ramal Take 10mg (2 tabs) twice daily for 7 days then 5mg (1 tab) twice daily until further directed. FLONASE 50 mcg/actuation nasal spray Generic drug:  fluticasone 2 Sprays by Both Nostrils route daily as needed for Rhinitis. hydrocortisone 2.5 % rectal cream  
Commonly known as:  ANUSOL-HC Insert  into rectum four (4) times daily. Prescriptions Sent to Pharmacy Refills  
 apixaban (ELIQUIS) 5 mg tablet 2 Sig: Take 10mg (2 tabs) twice daily for 7 days then 5mg (1 tab) twice daily until further directed.   
 Class: Normal  
 Pharmacy: [facility] [address], [address]. Ph #: [phone]  
 hydrocortisone (ANUSOL-HC) 2.5 % rectal cream 0 Sig: Insert  into rectum four (4) times daily. Class: Normal  
 Pharmacy: [facility] [address], [address]. Ph #: [phone] Route: Rectal  
  
Introducing [facility] SERVICES! Dear Stella Youssef: Thank you for requesting a [employer] account. Our records indicate that you already have an active [employer] account. You can access your account anytime at [email or web address] Did you know that you can access your hospital and ER discharge instructions at any time in [employer]? You can also review all of your test results from your hospital stay or ER visit. Additional Information If you have questions, please visit the Frequently Asked Questions section of the [employer] website at [email or web address] Remember, [employer] is NOT to be used for urgent needs. For medical emergencies, dial 911. Now available from your iPhone and Android! Please provide this summary of care documentation to your next provider. Your primary care clinician is listed as NONE. If you have any questions after today's visit, please call [phone].

## 2017-04-03 NOTE — PROGRESS NOTES
Hematology Consult    REASON FOR CONSULT: DVT and PE  REQUESTED BY: Dr. Jessy Lopez: Mr. Luciano Craig is a 45 y.o. male with no significant past medical history presented to the ER with left leg swelling on 3/30/17. Lower extremity Dopplers on 3/28/17 consistent with thrombosis involving the distal femoral, popliteal, soleal, posterior tibial, peroneal and soleal veins on the left. CT chest on 3/28/17 demonstrated bilateral pulmonary emboli. CBC was notable for a slightly decreased platelet count of 525X on 3/29/17, CMP was notable for a normal creatinine of 1.1. Patient was started on apixaban 10 mg 2 times daily on 3/30/17    He states that he drove to [place] and back about a month ago. He developed some left leg swelling as this is a reexcision right after his trip, this got progressively worse with pain . He then developed VILLA and dizziness, prompting his visit to the ER. He travels frequently and makes 8 hour trips. He has no personal or family history of blood clotting disorders. At the time of his trip to [place] he was also having 1-2 months of \"head cold\" and some ringing in the ear. He is still having these symptoms. Antihistamines and antibiotics have not helped. Denies any fevers, chills, night sweats, new lumps or bumps, headache, vision changes, abdominal pain. He has not had a colonoscopy. Today for the first time he noticed some blood on the wip after a bowel movement. He has no other bleeding. SOB and swelling have improved, left leg swelling is worse. He rarely uses alcohol, has never smoked. Father had bladder cancer in his 76s, had a cousin with liver cancer.          Past Surgical History:   Procedure Laterality Date    HX HERNIA REPAIR      HX OTHER SURGICAL      knee surgery, shoulder surgery        No Known Allergies    Current Outpatient Prescriptions   Medication Sig Dispense Refill    apixaban (ELIQUIS) 5 mg tablet Take 10mg (2 tabs) twice daily for 7 days then 5mg (1 tab) twice daily until further directed. 72 Tab 0    fluticasone (FLONASE) 50 mcg/actuation nasal spray 2 Sprays by Both Nostrils route daily as needed for Rhinitis. Social History     Social History    Marital status: SINGLE     Spouse name: N/A    Number of children: N/A    Years of education: N/A     Social History Main Topics    Smoking status: Never Smoker    Smokeless tobacco: None    Alcohol use None    Drug use: None    Sexual activity: Not Asked     Other Topics Concern    None     Social History Narrative       No family history on file. ROS  A 12 point review of systems was obtained and is negative except as listed in HPI.     Physical Examination:   Visit Vitals    /90 (BP 1 Location: Left arm, BP Patient Position: Sitting)    Pulse 68    Temp 97.8 °F (36.6 °C) (Oral)    Resp 18    Ht 6' 1\" (1.854 m)    Wt 223 lb 6.4 oz (101.3 kg)    SpO2 93%    BMI 29.47 kg/m2     General appearance - alert, well appearing, and in no distress  Mental status - oriented to person, place, and time  Mouth - mucous membranes moist, pharynx normal without lesions  Neck - supple, no significant adenopathy  Lymphatics - no palpable lymphadenopathy, no hepatosplenomegaly  Chest - clear to auscultation, no wheezes, rales or rhonchi, symmetric air entry  Heart - normal rate, regular rhythm, normal S1, S2, no murmurs, rubs, clicks or gallops  Abdomen - soft, nontender, nondistended, no masses or organomegaly, bowel sounds present  Back exam - full range of motion, no tenderness, palpable spasm or pain on motion  Neurological - normal speech, no focal findings or movement disorder noted  Musculoskeletal - no joint tenderness, deformity or swelling  Extremities - peripheral pulses normal, L calf with wider girth than the R  Skin - normal coloration and turgor, no rashes, no suspicious skin lesions noted    LABS  Lab Results   Component Value Date/Time    WBC 7.0 03/29/2017 02:28 AM HGB 14.3 03/29/2017 02:28 AM    HCT 41.8 03/29/2017 02:28 AM    PLATELET 080 46/81/9392 02:28 AM    MCV 91.3 03/29/2017 02:28 AM    ABS. NEUTROPHILS 5.7 03/28/2017 02:27 PM     Lab Results   Component Value Date/Time    Sodium 139 03/28/2017 02:27 PM    Potassium 4.1 03/28/2017 02:27 PM    Chloride 103 03/28/2017 02:27 PM    CO2 30 03/28/2017 02:27 PM    Glucose 91 03/28/2017 02:27 PM    BUN 24 03/28/2017 02:27 PM    Creatinine 1.10 03/28/2017 02:27 PM    GFR est AA >60 03/28/2017 02:27 PM    GFR est non-AA >60 03/28/2017 02:27 PM    Calcium 9.0 03/28/2017 02:27 PM     Lab Results   Component Value Date/Time    AST (SGOT) 19 03/28/2017 02:27 PM    Alk. phosphatase 81 03/28/2017 02:27 PM    Protein, total 7.8 03/28/2017 02:27 PM    Albumin 4.4 03/28/2017 02:27 PM    Globulin 3.4 03/28/2017 02:27 PM    A-G Ratio 1.3 03/28/2017 02:27 PM       ASSESSMENT  Mr. John Huff is a 45 y.o. male with no significant medical problems was diagnosed with a symptomatic left lower extremity DVT and bilateral symptomatic pulmonary emboli after an 11 hour car ride. Travel of more than 8 hours duration is a modest risk factor for venous thromboembolism,  most commonly associated with airplane travel. Travel associated VTE are events that occur within 4 weeks of travel ( Sudeep MEEK et al. [place] Thrombolysis 2016)  Risk of recurrent VTE provoked by non-surgical factor - 5 percent for the first year; 2.5 percent/year thereafter. Would recommend 3 months of anticoagulation, if patient can limit immobility during travel to less than 3 hours and use compression stockings during travel. Also counseled to pursue age-appropriate cancer screening. Incidental thrombocytopenia during admission could be consumptive during an acute thrombotic episode.     PLAN  Continue with apixaban for a total of 3 months (prescription renewed for 5 mg twice daily after completion of 7 days on 10 mg twice daily)  Screening colonoscopy  Has an appointment with otolaryngology to evaluate persistent nasal congestion and bilateral tingling  No further thrombophilia workup at this time    Return to clinic in 3 months    Marco Rivas MD    Mr. Soni has a reminder for a \"due or due soon\" health maintenance. I have asked that he contact his primary care provider for follow-up on this health maintenance.

## 2017-04-25 PROBLEM — I82.409 DEEP VENOUS THROMBOSIS OF LOWER EXTREMITY (HCC): Status: ACTIVE | Noted: 2017-03-28

## 2017-04-26 ENCOUNTER — TELEPHONE (OUTPATIENT)
Dept: ONCOLOGY | Age: 52
End: 2017-04-26

## 2017-04-26 DIAGNOSIS — I82.402 ACUTE DEEP VEIN THROMBOSIS (DVT) OF LEFT LOWER EXTREMITY, UNSPECIFIED VEIN (HCC): Primary | ICD-10-CM

## 2017-04-26 NOTE — TELEPHONE ENCOUNTER
Per office notes it appears swelling has never fully resolved since diagnosis. Would advise to continue blood thinners, try compression stockings for relief. Will eval with another doppler to make sure clot not worse or new clot has developed on Eliquis. Confirm patient is taking Eliquis as prescribed. May need referral to vascular surgery if pain/swelling not improved.

## 2017-04-26 NOTE — TELEPHONE ENCOUNTER
Patient called stating that the leg that he had the DVT in in swollen and painful. He would like to know if he needs to be seen. Please advise.

## 2017-04-26 NOTE — TELEPHONE ENCOUNTER
Call placed to patient to update on provider recommendations, HIPAA verified. Patient states unable to come out for appt today, however available in the morning. Denies any SOB or severe discomfort. Appt scheduled for 930 am for doppler on 4/27. Patient contacted and states will be at [place] [id number] at [facility] on 4/27. States that he is taking his Eliquis as prescribed and has already purchased compression stockings, which he finds helps immensely.

## 2017-04-27 ENCOUNTER — HOSPITAL ENCOUNTER (OUTPATIENT)
Dept: VASCULAR SURGERY | Age: 52
Discharge: HOME | End: 2017-04-27
Attending: NURSE PRACTITIONER
Payer: COMMERCIAL

## 2017-04-27 DIAGNOSIS — I82.402 ACUTE DEEP VEIN THROMBOSIS (DVT) OF LEFT LOWER EXTREMITY, UNSPECIFIED VEIN (HCC): ICD-10-CM

## 2017-04-27 PROCEDURE — 93971 EXTREMITY STUDY: CPT

## 2017-04-27 NOTE — PROCEDURES
[address]  *** FINAL REPORT ***    Name: Timmy Mason  MRN: [id number]    Outpatient  : [address] 1965  HIS Order #: [id number]  [id number] [facility] Visit #: [id number]  Date: 2017    TYPE OF TEST: Peripheral Venous Testing    REASON FOR TEST  Followup of left femoral, popliteal, gastrocnemius, posterior tibial,  peroneal, and calf soleal DVT found on ultrasound exam of 3/28/2017. Left Leg:-  Deep venous thrombosis:           Yes  Proximal extent of thrombus:      Popliteal Above The Knee  Superficial venous thrombosis:    No  Deep venous insufficiency:        Not examined  Superficial venous insufficiency: Not examined      INTERPRETATION/FINDINGS  PROCEDURE:  Color duplex ultrasound imaging of lower extremity veins. FINDINGS:       Right: The common femoral vein is patent and without evidence of  thrombus. Phasic flow is observed. This extremity was not otherwise  evaluated. Left:   The popliteal and gastrocnemius are not compressible and  no flow is detected by Doppler, consistent with thrombosis (as noted  on exam of 3/28/2017). The common femoral, deep femoral, femoral,  posterior tibial, peroneal, calf soleal, and great saphenous are  patent and without evidence of thrombus;  each is fully compressible  and there is no narrowing of the flow channel on color Doppler  imaging. Phasic flow is observed in the common femoral vein. IMPRESSION:  1. Continued thrombosis of the left popliteal and gastrocnemius veins   as detected on the previous exam done on 3-.  2.  The left distal femoral, posterior tibial, peroneal, and calf  soleal veins (noted to contain thrombus on the previous exam), appear  to be free of thrombus on today's exam.    ADDITIONAL COMMENTS    I have personally reviewed the data relevant to the interpretation of  this  study.     TECHNOLOGIST: Gunnar Dick RVT  Signed: 2017 10:36 AM    PHYSICIAN: Abi Smith MD  Signed: 2017 08:16 AM

## 2017-05-02 ENCOUNTER — OFFICE VISIT (OUTPATIENT)
Dept: ONCOLOGY | Age: 52
End: 2017-05-02

## 2017-05-02 VITALS
HEART RATE: 77 BPM | OXYGEN SATURATION: 100 % | WEIGHT: 224.6 LBS | BODY MASS INDEX: 29.77 KG/M2 | TEMPERATURE: 97 F | HEIGHT: 73 IN | SYSTOLIC BLOOD PRESSURE: 144 MMHG | RESPIRATION RATE: 20 BRPM | DIASTOLIC BLOOD PRESSURE: 85 MMHG

## 2017-05-02 DIAGNOSIS — I87.009 POST-PHLEBITIC SYNDROME: ICD-10-CM

## 2017-05-02 DIAGNOSIS — I26.99 OTHER ACUTE PULMONARY EMBOLISM: ICD-10-CM

## 2017-05-02 DIAGNOSIS — O22.30 DVT (DEEP VEIN THROMBOSIS) IN PREGNANCY: Primary | ICD-10-CM

## 2017-05-02 NOTE — PROGRESS NOTES
Hematology follow up    REASON FOR CONSULT: DVT and PE  REQUESTED BY: Dr. Ashkan Ordaz: Mr. Galileo Shrestha is a 45 y.o. male with no significant past medical history presented to the ER with left leg swelling on 3/30/17. Lower extremity Dopplers on 3/28/17 consistent with thrombosis involving the distal femoral, popliteal, soleal, posterior tibial, peroneal and soleal veins on the left. CT chest on 3/28/17 demonstrated bilateral pulmonary emboli. CBC was notable for a slightly decreased platelet count of 551N on 3/29/17, CMP was notable for a normal creatinine of 1.1. Patient was started on apixaban 10 mg 2 times daily on 3/30/17. He drove to [place] and back about a month prior to the DVT. He developed some left leg swelling right after his trip, this got progressively worse with pain . He then developed VILLA and dizziness, prompting his visit to the ER. He rarely uses alcohol, has never smoked. Father had bladder cancer in his 76s, had a cousin with liver cancer. Due to the provoked nature of the clot, 3 months of Eliquis were recommended. Past Surgical History:   Procedure Laterality Date    HX HERNIA REPAIR      HX OTHER SURGICAL      knee surgery, shoulder surgery        No Known Allergies    Current Outpatient Prescriptions   Medication Sig Dispense Refill    apixaban (ELIQUIS) 5 mg tablet Take 10mg (2 tabs) twice daily for 7 days then 5mg (1 tab) twice daily until further directed. 72 Tab 2    hydrocortisone (ANUSOL-HC) 2.5 % rectal cream Insert  into rectum four (4) times daily. 30 g 0    fluticasone (FLONASE) 50 mcg/actuation nasal spray 2 Sprays by Both Nostrils route daily as needed for Rhinitis.          Social History     Social History    Marital status: SINGLE     Spouse name: N/A    Number of children: N/A    Years of education: N/A     Social History Main Topics    Smoking status: Never Smoker    Smokeless tobacco: None    Alcohol use None    Drug use: None  Sexual activity: Not Asked     Other Topics Concern    None     Social History Narrative       No family history on file. ROS  Today he comes to follow up as L leg swells up intermittently especially after exercise. He had a repeat doppler on 4/27/17 which showed improvement in the LE clot extent. He has been frustrated as he has not been able to go back to his previous level of activity. He still walks 4 miles and bikes. Overall is better with compression sleeve. Has no CP, SOB. Not bleeding. Physical Examination:   Visit Vitals    /85    Pulse 77    Temp 97 °F (36.1 °C)    Resp 20    Ht 6' 1\" (1.854 m)    Wt 224 lb 9.6 oz (101.9 kg)    SpO2 100%    BMI 29.63 kg/m2     General appearance - alert, well appearing, and in no distress  Mental status - oriented to person, place, and time  Mouth - mucous membranes moist, pharynx normal without lesions  Lymphatics - no palpable lymphadenopathy, no hepatosplenomegaly  Chest - clear to auscultation, no wheezes, rales or rhonchi, symmetric air entry  Abdomen - soft, nontender, nondistended, no masses or organomegaly, bowel sounds present  Ext: L leg sleeve, no swelling    LABS  Lab Results   Component Value Date/Time    WBC 7.0 03/29/2017 02:28 AM    HGB 14.3 03/29/2017 02:28 AM    HCT 41.8 03/29/2017 02:28 AM    PLATELET 024 10/02/9385 02:28 AM    MCV 91.3 03/29/2017 02:28 AM    ABS. NEUTROPHILS 5.7 03/28/2017 02:27 PM     Lab Results   Component Value Date/Time    Sodium 139 03/28/2017 02:27 PM    Potassium 4.1 03/28/2017 02:27 PM    Chloride 103 03/28/2017 02:27 PM    CO2 30 03/28/2017 02:27 PM    Glucose 91 03/28/2017 02:27 PM    BUN 24 03/28/2017 02:27 PM    Creatinine 1.10 03/28/2017 02:27 PM    GFR est AA >60 03/28/2017 02:27 PM    GFR est non-AA >60 03/28/2017 02:27 PM    Calcium 9.0 03/28/2017 02:27 PM     Lab Results   Component Value Date/Time    AST (SGOT) 19 03/28/2017 02:27 PM    Alk.  phosphatase 81 03/28/2017 02:27 PM    Protein, total 7.8 03/28/2017 02:27 PM    Albumin 4.4 03/28/2017 02:27 PM    Globulin 3.4 03/28/2017 02:27 PM    A-G Ratio 1.3 03/28/2017 02:27 PM     Duplex 3/28/17  Left: Consistent with thrombosis involving the distal femoral,  popliteal, soleal, posterior tibial, peroneal and soleal veins as  demonstrated by vein non-compressibility, and by a narrowing or  occlusion of the flow channel on color Doppler imaging. The remaining  segments, common femoral, deep femoral, proximal and mid femoral and  great saphenous are patent and without evidence of thrombus; each is  fully compressible and there is no narrowing of the flow channel on  color Doppler imaging. Phasic flow is observed in the common femoral  Vein. Duplex 4/27/17  1. Continued thrombosis of the left popliteal and gastrocnemius veins  as detected on the previous exam done on 3-.  2. The left distal femoral, posterior tibial, peroneal, and calf  soleal veins (noted to contain thrombus on the previous exam), appear  to be free of thrombus on today's exam.    ASSESSMENT  Mr. Glo Covarrubias is a 45 y.o. male with no significant medical problems was diagnosed with a symptomatic left lower extremity DVT and bilateral symptomatic pulmonary emboli after an 11 hour car ride on 3/28/17. Travel of more than 8 hours duration is a modest risk factor for venous thromboembolism,  most commonly associated with airplane travel. Travel associated VTE are events that occur within 4 weeks of travel ( Sudeep MEEK et al. [place] Thrombolysis 2016)  Risk of recurrent VTE provoked by non-surgical factor  5 percent for the first year; 2.5 percent/year thereafter. Recommended 3 months of anticoagulation, if patient can limit immobility during travel to less than 3 hours and use compression stockings during travel. Also counseled to pursue age-appropriate cancer screening. He has had some exercise induced post phlebitic edema.  Repeat Duplex shows resolution of the majority of thrombus through popliteal and gastrocnemius have persistent thrombus. Has no extension. Due to the extent of the clot, may take longer to [facility] and also leave him with some post phlebitic syndrome. Reassured that Eliquis is effective. If symptoms and thrombus persist at 3 months may continue for a total of 6 monmths    PLAN  Continue with apixaban for a total of 3 months (Finishes June 2017)   Screening colonoscopy  Duplex of LLE after completion of 3 months of AC    Return in June 2017 as scheduled    Eloise Gallegos MD    Mr. Soni has a reminder for a \"due or due soon\" health maintenance. I have asked that he contact his primary care provider for follow-up on this health maintenance.

## 2017-05-11 PROBLEM — I82.4Z2 ACUTE EMBOLISM AND THROMBOSIS OF DEEP VEIN OF LEFT DISTAL LOWER EXTREMITY (HCC): Status: ACTIVE | Noted: 2017-05-02

## 2017-06-26 ENCOUNTER — HOSPITAL ENCOUNTER (OUTPATIENT)
Dept: VASCULAR SURGERY | Age: 52
Discharge: HOME | End: 2017-06-26
Attending: INTERNAL MEDICINE
Payer: COMMERCIAL

## 2017-06-26 DIAGNOSIS — O22.30 DVT (DEEP VEIN THROMBOSIS) IN PREGNANCY: ICD-10-CM

## 2017-06-26 PROCEDURE — 93971 EXTREMITY STUDY: CPT

## 2017-06-26 NOTE — PROCEDURES
[facility] AT [place]  *** FINAL REPORT ***    Name: Tommy Brooks  MRN: [id number]    Outpatient  : [address] 1965  HIS Order #: [id number]  [id number] [facility] Visit #: [id number]  Date: 2017    TYPE OF TEST: Peripheral Venous Testing    REASON FOR TEST  Left leg swelling. History of left lower extremity DVT found on  ultrasound exam on 3/29/2017. Most recent exam on 2017 showed  continued thrombosis of the left popliteal and gastrocnemius veins. Left Leg:-  Deep venous thrombosis:           Yes  Proximal extent of thrombus:      Popliteal Above The Knee  Superficial venous thrombosis:    No  Deep venous insufficiency:        Not examined  Superficial venous insufficiency: Not examined      INTERPRETATION/FINDINGS  PROCEDURE:  Color duplex ultrasound imaging of lower extremity veins. FINDINGS:       Right: The common femoral vein is patent and without evidence of  thrombus. Phasic flow is observed. This extremity was not otherwise  evaluated. Left:   The popliteal and gastrocnemius are not compressible and  there are low to medium level intraluminal echo's, consistent with  DVT. The popliteal does demonstrate some flow with flow augmentation  maneuvers, but no spontaneous flow is detected by Doppler and there is   substantial narrowing of the color Doppler flow channel. No flow is  detected in the gastrocnemius. The common femoral, deep femoral,  femoral, posterior tibial, peroneal, and great saphenous are patent  and without evidence of thrombus;  each is fully compressible and  there is no narrowing of the flow channel on color Doppler imaging. Phasic flow is observed in the common femoral vein. IMPRESSION:  There is continued evidence of left lower extremity DVT  involving the popliteal and gastrocnemius veins.   The popliteal is  partially recanalized, which is some improvement over the previous  exam done on 2017, however, there still appears to be significant   narrowing of the flow channel. The gastrocnemius still appears to be   occluded. ADDITIONAL COMMENTS    I have personally reviewed the data relevant to the interpretation of  this  study.     TECHNOLOGIST: Kal King RVT  Signed: 06/26/2017 02:09 PM    PHYSICIAN: Kelly Flores MD  Signed: 06/28/2017 07:57 AM

## 2017-06-30 ENCOUNTER — OFFICE VISIT (OUTPATIENT)
Dept: ONCOLOGY | Age: 52
End: 2017-06-30

## 2017-06-30 VITALS
SYSTOLIC BLOOD PRESSURE: 144 MMHG | TEMPERATURE: 97.6 F | RESPIRATION RATE: 20 BRPM | DIASTOLIC BLOOD PRESSURE: 88 MMHG | BODY MASS INDEX: 29.77 KG/M2 | HEIGHT: 73 IN | OXYGEN SATURATION: 97 % | HEART RATE: 77 BPM | WEIGHT: 224.6 LBS

## 2017-06-30 DIAGNOSIS — I82.532 CHRONIC DEEP VEIN THROMBOSIS (DVT) OF POPLITEAL VEIN OF LEFT LOWER EXTREMITY (HCC): Primary | ICD-10-CM

## 2017-06-30 DIAGNOSIS — I87.009 POST-PHLEBITIC SYNDROME: ICD-10-CM

## 2017-06-30 NOTE — PROGRESS NOTES
Hematology follow up    REASON FOR CONSULT: DVT and PE  REQUESTED BY: Dr. Tutu Vaughan: Mr. Monique Ariza is a 47 y.o. male with no significant past medical history presented to the ER with left leg swelling on 3/30/17. Lower extremity Dopplers on 3/28/17 consistent with thrombosis involving the distal femoral, popliteal, soleal, posterior tibial, peroneal and soleal veins on the left. CT chest on 3/28/17 demonstrated bilateral pulmonary emboli. CBC was notable for a slightly decreased platelet count of 564D on 3/29/17, CMP was notable for a normal creatinine of 1.1. Patient was started on apixaban 10 mg 2 times daily on 3/30/17. He drove to [place] and back about a month prior to the DVT. He developed some left leg swelling right after his trip, this got progressively worse with pain . He then developed VILLA and dizziness, prompting his visit to the ER. He rarely uses alcohol, has never smoked. Father had bladder cancer in his 76s, had a cousin with liver cancer. Due to the provoked nature of the clot, 3 months of Eliquis were recommended. Past Surgical History:   Procedure Laterality Date    HX HERNIA REPAIR      HX OTHER SURGICAL      knee surgery, shoulder surgery        No Known Allergies    Current Outpatient Prescriptions   Medication Sig Dispense Refill    apixaban (ELIQUIS) 5 mg tablet Take 10mg (2 tabs) twice daily for 7 days then 5mg (1 tab) twice daily until further directed. 72 Tab 2    hydrocortisone (ANUSOL-HC) 2.5 % rectal cream Insert  into rectum four (4) times daily. 30 g 0    fluticasone (FLONASE) 50 mcg/actuation nasal spray 2 Sprays by Both Nostrils route daily as needed for Rhinitis.          Social History     Social History    Marital status: SINGLE     Spouse name: N/A    Number of children: N/A    Years of education: N/A     Social History Main Topics    Smoking status: Never Smoker    Smokeless tobacco: Not on file    Alcohol use Not on file   Vanessa Evans Drug use: Not on file    Sexual activity: Not on file     Other Topics Concern    Not on file     Social History Narrative       No family history on file. ROS  Today he comes to follow up after 3 months of AC. Left leg still intermittently swells up. Pain is improved. Has no CP, SOB. Not bleeding. Physical Examination:   Visit Vitals    Ht 6' 1\" (1.854 m)    Wt 224 lb 9.6 oz (101.9 kg)    BMI 29.63 kg/m2     General appearance - alert, well appearing, and in no distress  Mental status - oriented to person, place, and time  Mouth - mucous membranes moist, pharynx normal without lesions  Lymphatics - no palpable lymphadenopathy, no hepatosplenomegaly  Chest - clear to auscultation, no wheezes, rales or rhonchi, symmetric air entry  Abdomen - soft, nontender, nondistended, no masses or organomegaly, bowel sounds present  Ext: L leg sleeve, no swelling    LABS  Lab Results   Component Value Date/Time    WBC 7.0 03/29/2017 02:28 AM    HGB 14.3 03/29/2017 02:28 AM    HCT 41.8 03/29/2017 02:28 AM    PLATELET 343 97/99/5360 02:28 AM    MCV 91.3 03/29/2017 02:28 AM    ABS. NEUTROPHILS 5.7 03/28/2017 02:27 PM     Lab Results   Component Value Date/Time    Sodium 139 03/28/2017 02:27 PM    Potassium 4.1 03/28/2017 02:27 PM    Chloride 103 03/28/2017 02:27 PM    CO2 30 03/28/2017 02:27 PM    Glucose 91 03/28/2017 02:27 PM    BUN 24 03/28/2017 02:27 PM    Creatinine 1.10 03/28/2017 02:27 PM    GFR est AA >60 03/28/2017 02:27 PM    GFR est non-AA >60 03/28/2017 02:27 PM    Calcium 9.0 03/28/2017 02:27 PM     Lab Results   Component Value Date/Time    AST (SGOT) 19 03/28/2017 02:27 PM    Alk.  phosphatase 81 03/28/2017 02:27 PM    Protein, total 7.8 03/28/2017 02:27 PM    Albumin 4.4 03/28/2017 02:27 PM    Globulin 3.4 03/28/2017 02:27 PM    A-G Ratio 1.3 03/28/2017 02:27 PM     Duplex 3/28/17  Left: Consistent with thrombosis involving the distal femoral,  popliteal, soleal, posterior tibial, peroneal and soleal veins as  demonstrated by vein non-compressibility, and by a narrowing or  occlusion of the flow channel on color Doppler imaging. The remaining  segments, common femoral, deep femoral, proximal and mid femoral and  great saphenous are patent and without evidence of thrombus; each is  fully compressible and there is no narrowing of the flow channel on  color Doppler imaging. Phasic flow is observed in the common femoral  Vein. Duplex 4/27/17  1. Continued thrombosis of the left popliteal and gastrocnemius veins  as detected on the previous exam done on 3-.  2. The left distal femoral, posterior tibial, peroneal, and calf  soleal veins (noted to contain thrombus on the previous exam), appear  to be free of thrombus on today's exam.    6/26/17  IMPRESSION:  There is continued evidence of left lower extremity DVT  involving the popliteal and gastrocnemius veins. The popliteal is  partially recanalized, which is some improvement over the previous  exam done on 4/27/2017, however, there still appears to be significant   narrowing of the flow channel. The gastrocnemius still appears to be occluded. ASSESSMENT  Mr. Ayan Hilliard is a 47 y.o. male with no significant medical problems was diagnosed with a symptomatic left lower extremity DVT and bilateral symptomatic pulmonary emboli after an 11 hour car ride on 3/28/17. Travel of more than 8 hours duration is a modest risk factor for venous thromboembolism,  most commonly associated with airplane travel. Travel associated VTE are events that occur within 4 weeks of travel ( Sudeep MEEK et al. [place] Juancarlos Thrombolysis 2016)  Risk of recurrent VTE provoked by non-surgical factor - 5 percent for the first year; 2.5 percent/year thereafter. Recommended 3 months of anticoagulation, if patient can limit immobility during travel to less than 3 hours and use compression stockings during travel. Also counseled to pursue age-appropriate cancer screening.     Residual vein obstruction is a mild risk factor for VTE recurrence. The presence or absence of ultrasound-identified RVO has a limited role in guiding the duration of therapeutic anticoagulation Lauren Andrade 2015)      Mr. John Huff is quite frustrated as he is active and the remnant symptoms are affecting him. He is interested in extending anticoagulation. Reviewed that there is no strong evidence to do that but a trial is reasonable. If we have to do that I would rather we switch to a different DOAC like Xarelto. I had a candid discussion with him that the residual clot is unlikely to resolve and he may have persistent post phlebitic symptoms. Will refer to vascular if symptoms persist in 3 months      PLAN  Xarelto 15 mg BID X 3 weeks then 20 mg daily for the remaining 9 weeks  Stop Eliquis  Duplex at 3 months  Screening colonoscopy      Return in 3 months    Marley Gates MD    Mr. Soni has a reminder for a \"due or due soon\" health maintenance. I have asked that he contact his primary care provider for follow-up on this health maintenance.

## 2017-09-25 ENCOUNTER — HOSPITAL ENCOUNTER (OUTPATIENT)
Dept: VASCULAR SURGERY | Age: 52
Discharge: HOME | End: 2017-09-25
Attending: INTERNAL MEDICINE
Payer: COMMERCIAL

## 2017-09-25 DIAGNOSIS — I82.532 CHRONIC DEEP VEIN THROMBOSIS (DVT) OF POPLITEAL VEIN OF LEFT LOWER EXTREMITY (HCC): ICD-10-CM

## 2017-09-25 PROCEDURE — 93971 EXTREMITY STUDY: CPT

## 2017-09-25 NOTE — PROCEDURES
[facility]  *** FINAL REPORT ***    Name: Mariana Valle  MRN: [id number]    Outpatient  : [address] 1965  HIS Order #: [id number]  [address] [facility] Visit #: [id number]  Date: 25 Sep 2017    TYPE OF TEST: Peripheral Venous Testing    REASON FOR TEST  Previous DVT    Left Leg:-  Deep venous thrombosis:           Yes  Proximal extent of thrombus:      Popliteal Above The Knee  Superficial venous thrombosis:    No  Deep venous insufficiency:        Not examined  Superficial venous insufficiency: Not examined      INTERPRETATION/FINDINGS  PROCEDURE:  Color duplex ultrasound imaging of lower extremity veins. FINDINGS:       Right: The common femoral vein is patent and without evidence of  thrombus. Phasic flow is observed. This extremity was not otherwise  evaluated. Left:  Consistent with partial chronic thrombosis involving the  popliteal vein as demonstrated by vein non-compressibility, and by a  narrowing or occlusion of the flow channel on color Doppler imaging. The common femoral, deep femoral, femoral, popliteal, posterior  tibial, peroneal, and great saphenous are patent and without evidence  of thrombus;  each is fully compressible and there is no narrowing of  the flow channel on color Doppler imaging. Phasic flow is observed in   the common femoral vein. IMPRESSION:  There is evidence of vein thrombosis, as described above. The ultrasound appearance is more consistent with a chronic than an  acute process. ADDITIONAL COMMENTS    I have personally reviewed the data relevant to the interpretation of  this  study. TECHNOLOGIST: Zachariah Sands.  Sherry Hong  Signed: 2017 01:29 PM    PHYSICIAN: Caitlyn Zheng MD  Signed: 2017 07:16 AM

## 2017-10-04 ENCOUNTER — OFFICE VISIT (OUTPATIENT)
Dept: ONCOLOGY | Age: 52
End: 2017-10-04

## 2017-10-04 VITALS
HEART RATE: 68 BPM | HEIGHT: 73 IN | TEMPERATURE: 97.8 F | DIASTOLIC BLOOD PRESSURE: 80 MMHG | WEIGHT: 212.8 LBS | BODY MASS INDEX: 28.2 KG/M2 | SYSTOLIC BLOOD PRESSURE: 124 MMHG | RESPIRATION RATE: 16 BRPM | OXYGEN SATURATION: 98 %

## 2017-10-04 DIAGNOSIS — I87.009 POST-PHLEBITIC SYNDROME: ICD-10-CM

## 2017-10-04 DIAGNOSIS — I82.532 CHRONIC DEEP VEIN THROMBOSIS (DVT) OF POPLITEAL VEIN OF LEFT LOWER EXTREMITY (HCC): Primary | ICD-10-CM

## 2017-10-04 RX ORDER — RIVAROXABAN 15 MG/1
TABLET, FILM COATED ORAL
COMMUNITY
Start: 2017-06-30 | End: 2018-06-04

## 2017-10-04 NOTE — PROGRESS NOTES
Kaitlin De La Vega is a 47 y.o. male here today for DVT of popliteal vein of left lower extremity f/u. VS stable. Patient denies pain.

## 2017-10-04 NOTE — PROGRESS NOTES
Hematology follow up    REASON FOR VISIT: DVT and PE  REQUESTED BY: Dr. Remi Lesches: Mr. Sue Murphy is a 47 y.o. male with no significant past medical history presented to the ER with left leg swelling on 3/30/17. Lower extremity Dopplers on 3/28/17 consistent with thrombosis involving the distal femoral, popliteal, soleal, posterior tibial, peroneal and soleal veins on the left. CT chest on 3/28/17 demonstrated bilateral pulmonary emboli. CBC was notable for a slightly decreased platelet count of 499K on 3/29/17, CMP was notable for a normal creatinine of 1.1. Patient was started on apixaban 10 mg 2 times daily on 3/30/17. He drove to [place] and back about a month prior to the DVT. He developed some left leg swelling right after his trip, this got progressively worse with pain . He then developed VILLA and dizziness, prompting his visit to the ER. Due to the provoked nature of the clot, 3 months of Eliquis were recommended. He had persistent symptoms and hence extended to additional 3 months on Xarelto. He rarely uses alcohol, has never smoked. Father had bladder cancer in his 76s, had a cousin with liver cancer. Past Surgical History:   Procedure Laterality Date    HX HERNIA REPAIR      HX OTHER SURGICAL      knee surgery, shoulder surgery        No Known Allergies    Current Outpatient Prescriptions   Medication Sig Dispense Refill    hydrocortisone (ANUSOL-HC) 2.5 % rectal cream Insert  into rectum four (4) times daily. 30 g 0    XARELTO 15 mg tab tablet       rivaroxaban (XARELTO) 20 mg tab tablet Take 1 Tab by mouth daily (with breakfast). 30 Tab 1    fluticasone (FLONASE) 50 mcg/actuation nasal spray 2 Sprays by Both Nostrils route daily as needed for Rhinitis.          Social History     Social History    Marital status: SINGLE     Spouse name: N/A    Number of children: N/A    Years of education: N/A     Social History Main Topics    Smoking status: Never Smoker    Smokeless tobacco: None    Alcohol use None    Drug use: None    Sexual activity: Not Asked     Other Topics Concern    None     Social History Narrative       No family history on file. ROS  Today he comes to follow up after 6 months of AC. Left leg still intermittently swells up, he wears compression stockings, has no pain and no skin changes. Has no CP, SOB. Not bleeding. Stopped Xarelto 2 weeks ago. He has been excercising and lost some weight. Does not smoke. Has no other changes in health. Not noted new changes in BM, no hematuria. Physical Examination:   Visit Vitals    /80 (BP 1 Location: Left arm, BP Patient Position: Sitting)    Pulse 68    Temp 97.8 °F (36.6 °C) (Oral)    Resp 16    Ht 6' 1\" (1.854 m)    Wt 212 lb 12.8 oz (96.5 kg)    SpO2 98%    BMI 28.08 kg/m2     General appearance - alert, well appearing, and in no distress  Mental status - oriented to person, place, and time  Mouth - mucous membranes moist, pharynx normal without lesions  Lymphatics - no palpable lymphadenopathy, no hepatosplenomegaly  Chest - clear to auscultation, no wheezes, rales or rhonchi, symmetric air entry  Abdomen - soft, nontender, nondistended, no masses or organomegaly, bowel sounds present  Ext: L leg sleeve, no swelling    LABS  Lab Results   Component Value Date/Time    WBC 7.0 03/29/2017 02:28 AM    HGB 14.3 03/29/2017 02:28 AM    HCT 41.8 03/29/2017 02:28 AM    PLATELET 634 54/67/8428 02:28 AM    MCV 91.3 03/29/2017 02:28 AM    ABS.  NEUTROPHILS 5.7 03/28/2017 02:27 PM     Lab Results   Component Value Date/Time    Sodium 139 03/28/2017 02:27 PM    Potassium 4.1 03/28/2017 02:27 PM    Chloride 103 03/28/2017 02:27 PM    CO2 30 03/28/2017 02:27 PM    Glucose 91 03/28/2017 02:27 PM    BUN 24 03/28/2017 02:27 PM    Creatinine 1.10 03/28/2017 02:27 PM    GFR est AA >60 03/28/2017 02:27 PM    GFR est non-AA >60 03/28/2017 02:27 PM    Calcium 9.0 03/28/2017 02:27 PM     Lab Results Component Value Date/Time    AST (SGOT) 19 03/28/2017 02:27 PM    Alk. phosphatase 81 03/28/2017 02:27 PM    Protein, total 7.8 03/28/2017 02:27 PM    Albumin 4.4 03/28/2017 02:27 PM    Globulin 3.4 03/28/2017 02:27 PM    A-G Ratio 1.3 03/28/2017 02:27 PM     Duplex 3/28/17  Left: Consistent with thrombosis involving the distal femoral,  popliteal, soleal, posterior tibial, peroneal and soleal veins as  demonstrated by vein non-compressibility, and by a narrowing or  occlusion of the flow channel on color Doppler imaging. The remaining  segments, common femoral, deep femoral, proximal and mid femoral and  great saphenous are patent and without evidence of thrombus; each is  fully compressible and there is no narrowing of the flow channel on  color Doppler imaging. Phasic flow is observed in the common femoral  Vein. Duplex 4/27/17  1. Continued thrombosis of the left popliteal and gastrocnemius veins  as detected on the previous exam done on 3-.  2. The left distal femoral, posterior tibial, peroneal, and calf  soleal veins (noted to contain thrombus on the previous exam), appear  to be free of thrombus on today's exam.    6/26/17  IMPRESSION:  There is continued evidence of left lower extremity DVT  involving the popliteal and gastrocnemius veins. The popliteal is  partially recanalized, which is some improvement over the previous  exam done on 4/27/2017, however, there still appears to be significant   narrowing of the flow channel. The gastrocnemius still appears to be occluded. 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 The common femoral, deep femoral, femoral, popliteal, posterior  tibial, peroneal, and great saphenous are patent and without evidence  of thrombus    ASSESSMENT  Mr. Ilsa Ramesh is a 47 y.o. male with no significant medical problems was diagnosed with a symptomatic left lower extremity DVT and bilateral symptomatic pulmonary emboli after an 11 hour car ride on 3/28/17. Travel of more than 8 hours duration is a modest risk factor for venous thromboembolism,  most commonly associated with airplane travel. Travel associated VTE are events that occur within 4 weeks of travel ( Sudeep MEEK et al. [place] Thrombolysis 2016)    Risk of recurrent VTE provoked by non-surgical factor - 5 percent for the first year; 2.5 percent/year thereafter. Recommended 3 months of anticoagulation but had persistent symptoms and clot hence extended to 6 months and had switched tp Xarelto at the 3 month david. Duplex now shows continued resolution of clot burden with only partial chronic popliteal vein thrombus  Residual vein obstruction is a mild risk factor for VTE recurrence. The presence or absence of ultrasound-identified RVO has a limited role in guiding the duration of therapeutic anticoagulation Carlisle Brittle 2015)    [facility] known to [facility] him  Discussed life style changes  Breaks and hydration while travelling and age appropriate cancer screening    PLAN  Stop Xarelto  Start ASA 81 mg daily  Compression stockings  Recommended he get a screening colonoscopy    RTC 6 months or sooner if needed    Nori Benites MD    Mr. Soni has a reminder for a \"due or due soon\" health maintenance. I have asked that he contact his primary care provider for follow-up on this health maintenance.

## 2018-06-04 ENCOUNTER — HOSPITAL ENCOUNTER (OUTPATIENT)
Dept: PREADMISSION TESTING | Age: 53
Discharge: HOME | End: 2018-06-04
Payer: COMMERCIAL

## 2018-06-04 VITALS
SYSTOLIC BLOOD PRESSURE: 151 MMHG | WEIGHT: 223.38 LBS | BODY MASS INDEX: 29.6 KG/M2 | TEMPERATURE: 97.7 F | HEIGHT: 73 IN | HEART RATE: 58 BPM | DIASTOLIC BLOOD PRESSURE: 84 MMHG

## 2018-06-04 LAB
ABO + RH BLD: NORMAL
ANION GAP SERPL CALC-SCNC: 8 MMOL/L (ref 5–15)
APPEARANCE UR: CLEAR
BACTERIA URNS QL MICRO: NEGATIVE /HPF
BILIRUB UR QL: NEGATIVE
BLOOD GROUP ANTIBODIES SERPL: NORMAL
BUN SERPL-MCNC: 18 MG/DL (ref 6–20)
BUN/CREAT SERPL: 20 (ref 12–20)
CALCIUM SERPL-MCNC: 9 MG/DL (ref 8.5–10.1)
CHLORIDE SERPL-SCNC: 104 MMOL/L (ref 97–108)
CO2 SERPL-SCNC: 30 MMOL/L (ref 21–32)
COLOR UR: NORMAL
CREAT SERPL-MCNC: 0.92 MG/DL (ref 0.7–1.3)
EPITH CASTS URNS QL MICRO: NORMAL /LPF
ERYTHROCYTE [DISTWIDTH] IN BLOOD BY AUTOMATED COUNT: 12.2 % (ref 11.5–14.5)
EST. AVERAGE GLUCOSE BLD GHB EST-MCNC: 111 MG/DL
GLUCOSE SERPL-MCNC: 79 MG/DL (ref 65–100)
GLUCOSE UR STRIP.AUTO-MCNC: NEGATIVE MG/DL
HBA1C MFR BLD: 5.5 % (ref 4.2–6.3)
HCT VFR BLD AUTO: 45.9 % (ref 36.6–50.3)
HGB BLD-MCNC: 15.2 G/DL (ref 12.1–17)
HGB UR QL STRIP: NEGATIVE
HYALINE CASTS URNS QL MICRO: NORMAL /LPF (ref 0–5)
INR PPP: 1 (ref 0.9–1.1)
KETONES UR QL STRIP.AUTO: NEGATIVE MG/DL
LEUKOCYTE ESTERASE UR QL STRIP.AUTO: NEGATIVE
MCH RBC QN AUTO: 31.8 PG (ref 26–34)
MCHC RBC AUTO-ENTMCNC: 33.1 G/DL (ref 30–36.5)
MCV RBC AUTO: 96 FL (ref 80–99)
NITRITE UR QL STRIP.AUTO: NEGATIVE
NRBC # BLD: 0 K/UL (ref 0–0.01)
NRBC BLD-RTO: 0 PER 100 WBC
PH UR STRIP: 6.5 [PH] (ref 5–8)
PLATELET # BLD AUTO: 192 K/UL (ref 150–400)
PMV BLD AUTO: 10.1 FL (ref 8.9–12.9)
POTASSIUM SERPL-SCNC: 4.2 MMOL/L (ref 3.5–5.1)
PROT UR STRIP-MCNC: NEGATIVE MG/DL
PROTHROMBIN TIME: 10 SEC (ref 9–11.1)
RBC # BLD AUTO: 4.78 M/UL (ref 4.1–5.7)
RBC #/AREA URNS HPF: NORMAL /HPF (ref 0–5)
SODIUM SERPL-SCNC: 142 MMOL/L (ref 136–145)
SP GR UR REFRACTOMETRY: 1.02 (ref 1–1.03)
SPECIMEN EXP DATE BLD: NORMAL
UA: UC IF INDICATED,UAUC: NORMAL
UROBILINOGEN UR QL STRIP.AUTO: 0.2 EU/DL (ref 0.2–1)
WBC # BLD AUTO: 5.8 K/UL (ref 4.1–11.1)
WBC URNS QL MICRO: NORMAL /HPF (ref 0–4)

## 2018-06-04 PROCEDURE — 81001 URINALYSIS AUTO W/SCOPE: CPT | Performed by: ORTHOPAEDIC SURGERY

## 2018-06-04 PROCEDURE — 85610 PROTHROMBIN TIME: CPT | Performed by: ORTHOPAEDIC SURGERY

## 2018-06-04 PROCEDURE — 85027 COMPLETE CBC AUTOMATED: CPT | Performed by: ORTHOPAEDIC SURGERY

## 2018-06-04 PROCEDURE — 86900 BLOOD TYPING SEROLOGIC ABO: CPT | Performed by: ORTHOPAEDIC SURGERY

## 2018-06-04 PROCEDURE — 93005 ELECTROCARDIOGRAM TRACING: CPT

## 2018-06-04 PROCEDURE — 36415 COLL VENOUS BLD VENIPUNCTURE: CPT | Performed by: ORTHOPAEDIC SURGERY

## 2018-06-04 PROCEDURE — 80048 BASIC METABOLIC PNL TOTAL CA: CPT | Performed by: ORTHOPAEDIC SURGERY

## 2018-06-04 PROCEDURE — 83036 HEMOGLOBIN GLYCOSYLATED A1C: CPT | Performed by: ORTHOPAEDIC SURGERY

## 2018-06-04 RX ORDER — ASPIRIN 81 MG/1
81 TABLET ORAL DAILY
COMMUNITY
End: 2018-06-21

## 2018-06-04 RX ORDER — IBUPROFEN 200 MG
200-400 TABLET ORAL AS NEEDED
COMMUNITY
End: 2018-06-21

## 2018-06-04 RX ORDER — GLUCOSAMINE/CHONDR SU A SOD 750-600 MG
1 TABLET ORAL DAILY
COMMUNITY
End: 2018-12-19

## 2018-06-05 LAB
ATRIAL RATE: 60 BPM
BACTERIA SPEC CULT: NORMAL
BACTERIA SPEC CULT: NORMAL
CALCULATED P AXIS, ECG09: 12 DEGREES
CALCULATED R AXIS, ECG10: 12 DEGREES
CALCULATED T AXIS, ECG11: 34 DEGREES
DIAGNOSIS, 93000: NORMAL
P-R INTERVAL, ECG05: 178 MS
Q-T INTERVAL, ECG07: 416 MS
QRS DURATION, ECG06: 104 MS
QTC CALCULATION (BEZET), ECG08: 416 MS
SERVICE CMNT-IMP: NORMAL
VENTRICULAR RATE, ECG03: 60 BPM

## 2018-06-15 NOTE — H&P
Millie Karla  Location: [place] [address] [facility]  Patient #: [id number]  : 1965  Single / Language: [place] / Race: White  Male      History of Present Illness   The patient is a 47year old male who presents with a complaint of right knee pain. He was last seen on 2018.  The patient states that his pain is worse since his last visit and rates the severity of his right knee pain as a 7 out 10.  The patient describes his pain is sharp, aching, and constant.  His pain does occasionally wake him up from sleep at night.  The patient has been dispensing some weakness and swelling in the right knee.  He has been taking Advil for his discomfort.  The patient did receive a cortisone injection at his last visit and states that this did help reduce his discomfort. Problem List/Past Medical   AC ARTHRITIS (715.11)    BURSITIS / TENDONITIS (726.10)    IMPINGMENT SYNDROME (726.2)    FOLLOW-UP AFTER SURGERY (V67.00)    Dietary counseling (V65.3)    SLAP TEAR (840.7)    Weight above 97th percentile (V49.89  Z78.9)    REVIEW OF SYSTEMS: Systems were reviewed by the provider.    Primary osteoarthritis of right knee (715.16  M17.11)    Lateral knee pain, right (719.46  M25.561)    Knee effusion, right (719.06  M25.461)    Lateral epicondylitis of left elbow (726.32  M77.12)    Elbow pain, chronic, left (719.42  M25.522)    SHOULDER PAIN (719.41)      Allergies   No Known Drug Allergies  [2013]:  No Known Allergies  [2013]: Family History  Hypertension    Bladder problems    Hypercholesterolemia      Social History   Tobacco use   Never smoker. smokes 0 cigar(s) per week, uses 0 can(s) of smokeless tobacco per week.   Illicit drug use   : none  Sun Exposure   2013: frequently  HIV risk factors   2013: no  Seat Belt Use   2013: always  Tobacco / smoke exposure   2013: No  Alcohol use   2013: Drinks beer 4 times per month having 3-5 drinks per occasion, rarely having more than 5 drinks per occasion. Exercise   01/29/2013: Bicycles and walks 5-6 times a week. Caffeine use   01/29/2013: Drinks soft drinks 1-2 times a day. Medication History  Aleve  (220MG Capsule, 1 (one) Oral) Active. Medications Reconciled     Pregnancy / Birth History  None  [08/29/2013]:    Past Surgical History  Other Joint Surgery    Arthroscopy of Knee   right    Other Problems   Arthritis    Unspecified Diagnosis    Post-op pain (338.18)    Treatment options were discussed with the patient in full.          Review of Systems   General Not Present- Appetite Loss, Chills, Fatigue, Fever, HIV Exposure, Night Sweats, Persistent Infections, Seasonal Allergies, Weight Gain and Weight Loss. Skin Not Present- Itching, Nail Changes, Poor Wound Healing, Rash, Skin Color Changes, Suspicious Lesions and Yellowish Skin Color. HEENT Not Present- Decreased Hearing, Double Vision, Earache, Hoarseness, Jaundice/Yellow Eyes, Loose Teeth, Nose Bleed, Ringing in the Ears and Sore Throat. Respiratory Not Present- Bloody sputum, Chronic Cough, Difficulty Breathing, Snoring, Wakes up from Sleep Wheezing or Short of Breath and Wheezing. Cardiovascular Not Present- Bluish Discoloration Of Lips Or Nails, Chest Pain, Difficulty Breathing Lying Down, Difficulty Breathing On Exertion, Leg Cramps With Exertion, Palpitations and Swelling of Extremities. Gastrointestinal Not Present- Abdominal Pain, Black, Tarry Stool, Change in Bowel Habits, Cirrhosis, Constipation, Diarrhea, Difficulty Swallowing, Nausea and Vomiting. Male Genitourinary Not Present- Blood in Urine, Frequency, Painful Urination, Pelvic Pain, Trouble Starting Urinary Stream and Urgency. Musculoskeletal Present- Joint Pain and Joint Stiffness. Not Present- Back Pain and Joint Swelling. Neurological Not Present- Fainting, Headaches, Memory Loss, Numbness, Seizures, Tingling, Tremor, Unsteadiness and Weakness.   Psychiatric Not Present- Anxiety, Bipolar and Depression. Endocrine Not Present- Cold Intolerance, Excessive Hunger, Excessive Thirst, Excessive Urination and Heat Intolerance. Hematology Not Present- Abnormal Bruising , Enlarged Lymph Nodes, Excessive bleeding and Skin Discoloration. Vitals   Weight: 225 lb   Height: 72 in   Body Surface Area: 2.24 m²   Body Mass Index: 30.52 kg/m²            Physical Exam  The physical exam findings are as follows:  Note: The patient is well developed and well nourished. The patient presents today alert and oriented x3 and with a normal mood and affect.  The patient stands with a normal weightbearing line but walks with a slightly antalgic gait because of his right knee pain. Right knee has tenderness along the lateral and patellofemoral joint line with palpation. Fiordaliza Lame is an equivocal Rosanna's test. Fiordaliza Lame is significant anterior joint line tenderness which radiates down the tibia.  There is a moderate size effusion present.  Range of motion is well maintained except for terminal flexion.  There is no evidence of instability.  There is mild quadriceps weakness.  Sensation is intact and pulses are 2+.  The skin is normal.    Left knee is without point tenderness, has full range of motion, has no instability, muscle strength is normal, sensations intact,  pulses are 2+ and the skin is normal.        Assessment & Plan   Chronic pain of right knee (719.46  M25.561)  Patellofemoral arthrosis (715.96  M17.10)  Primary osteoarthritis of right knee (715.16  M17.11)  REVIEW OF SYSTEMS: Systems were reviewed by the provider.(V49.9)  Current Plans  Pt Education - How to access health information online: discussed with patient and provided information. Weight above 97th percentile (V49.89  Z78.9)  Current Plans  LIFESTYLE EDUCATION REGARDING DIET (06178)  Treatment options were discussed with the patient in full.(V65.49)  Current Plans  Discussed Rest, Ice, Compression and Elevation details.   Surgery to be scheduled  The risks of the surgery were discussed including infection, deep vein thrombosis, pulmonary embolus, cardiorespiratory complications, anesthetic risks, possible scar formation, cardiac compromise, stroke, death, leg length inequality, subluxation and possible implant failure. Understands the risks and wishes to proceed with surgical intervention. Note: We discussed the patient's ongoing right knee pain, effusion, and osteoarthritis.  The possible treatment options were discussed with the patient and we elected to refer him to our total joint specialists to discuss a right knee uni-lateral compartment arthroplasty.  The risks and benefits of the procedure were discussed in detail with the patient and he would like to proceed. Wilbert Patti we will schedule this at his [facility] will refer to his treatment plan moving forward.  I will see him back on an as-needed basis.  I did encourage him to continue to ice and elevate when possible, modify his activity level based on his right knee pain, and use anti-inflammatory medication when necessary. [facility] is also to avoid any deep knee bend activities against resistance, squatting, kneeling, and stairs if possible.  I will see him back moving forward on an as-needed basis as mentioned above.

## 2018-06-18 ENCOUNTER — ANESTHESIA EVENT (OUTPATIENT)
Dept: SURGERY | Age: 53
DRG: 470 | End: 2018-06-18
Payer: COMMERCIAL

## 2018-06-19 ENCOUNTER — HOSPITAL ENCOUNTER (INPATIENT)
Age: 53
LOS: 2 days | Discharge: HOME HEALTH | DRG: 470 | End: 2018-06-21
Attending: ORTHOPAEDIC SURGERY | Admitting: ORTHOPAEDIC SURGERY
Payer: COMMERCIAL

## 2018-06-19 ENCOUNTER — ANESTHESIA (OUTPATIENT)
Dept: SURGERY | Age: 53
DRG: 470 | End: 2018-06-19
Payer: COMMERCIAL

## 2018-06-19 DIAGNOSIS — M17.11 PRIMARY OSTEOARTHRITIS OF RIGHT KNEE: Primary | ICD-10-CM

## 2018-06-19 PROBLEM — M17.10 ARTHRITIS OF KNEE: Status: ACTIVE | Noted: 2018-06-19

## 2018-06-19 LAB
ABO + RH BLD: NORMAL
BLOOD GROUP ANTIBODIES SERPL: NORMAL
GLUCOSE BLD STRIP.AUTO-MCNC: 100 MG/DL (ref 65–100)
SERVICE CMNT-IMP: NORMAL
SPECIMEN EXP DATE BLD: NORMAL

## 2018-06-19 PROCEDURE — 77030028224 HC PDNG CST BSNM -A: Performed by: ORTHOPAEDIC SURGERY

## 2018-06-19 PROCEDURE — 74011250636 HC RX REV CODE- 250/636: Performed by: ORTHOPAEDIC SURGERY

## 2018-06-19 PROCEDURE — 77030031139 HC SUT VCRL2 J&J -A: Performed by: ORTHOPAEDIC SURGERY

## 2018-06-19 PROCEDURE — 82962 GLUCOSE BLOOD TEST: CPT

## 2018-06-19 PROCEDURE — 77030034850: Performed by: ORTHOPAEDIC SURGERY

## 2018-06-19 PROCEDURE — 74011250636 HC RX REV CODE- 250/636: Performed by: ANESTHESIOLOGY

## 2018-06-19 PROCEDURE — 74011000258 HC RX REV CODE- 258

## 2018-06-19 PROCEDURE — 74011250636 HC RX REV CODE- 250/636: Performed by: PHYSICIAN ASSISTANT

## 2018-06-19 PROCEDURE — 77030000032 HC CUF TRNQT ZIMM -B: Performed by: ORTHOPAEDIC SURGERY

## 2018-06-19 PROCEDURE — 77030018822 HC SLV COMPR FT COVD -B

## 2018-06-19 PROCEDURE — 74011250637 HC RX REV CODE- 250/637: Performed by: ORTHOPAEDIC SURGERY

## 2018-06-19 PROCEDURE — 76060000035 HC ANESTHESIA 2 TO 2.5 HR: Performed by: ORTHOPAEDIC SURGERY

## 2018-06-19 PROCEDURE — 65270000029 HC RM PRIVATE

## 2018-06-19 PROCEDURE — 86900 BLOOD TYPING SEROLOGIC ABO: CPT | Performed by: ORTHOPAEDIC SURGERY

## 2018-06-19 PROCEDURE — 74011250636 HC RX REV CODE- 250/636

## 2018-06-19 PROCEDURE — 77030018836 HC SOL IRR NACL ICUM -A: Performed by: ORTHOPAEDIC SURGERY

## 2018-06-19 PROCEDURE — C1713 ANCHOR/SCREW BN/BN,TIS/BN: HCPCS | Performed by: ORTHOPAEDIC SURGERY

## 2018-06-19 PROCEDURE — 76010000162 HC OR TIME 1.5 TO 2 HR INTENSV-TIER 1: Performed by: ORTHOPAEDIC SURGERY

## 2018-06-19 PROCEDURE — 77030011640 HC PAD GRND REM COVD -A: Performed by: ORTHOPAEDIC SURGERY

## 2018-06-19 PROCEDURE — 76210000006 HC OR PH I REC 0.5 TO 1 HR: Performed by: ORTHOPAEDIC SURGERY

## 2018-06-19 PROCEDURE — 77030020788: Performed by: ORTHOPAEDIC SURGERY

## 2018-06-19 PROCEDURE — 77030035236 HC SUT PDS STRATFX BARB J&J -B: Performed by: ORTHOPAEDIC SURGERY

## 2018-06-19 PROCEDURE — C1776 JOINT DEVICE (IMPLANTABLE): HCPCS | Performed by: ORTHOPAEDIC SURGERY

## 2018-06-19 PROCEDURE — 74011250637 HC RX REV CODE- 250/637: Performed by: PHYSICIAN ASSISTANT

## 2018-06-19 PROCEDURE — 77030020782 HC GWN BAIR PAWS FLX 3M -B

## 2018-06-19 PROCEDURE — 74011000250 HC RX REV CODE- 250: Performed by: ORTHOPAEDIC SURGERY

## 2018-06-19 PROCEDURE — 77030012935 HC DRSG AQUACEL BMS -B: Performed by: ORTHOPAEDIC SURGERY

## 2018-06-19 PROCEDURE — 74011000250 HC RX REV CODE- 250

## 2018-06-19 PROCEDURE — 0SRC0J9 REPLACEMENT OF RIGHT KNEE JOINT WITH SYNTHETIC SUBSTITUTE, CEMENTED, OPEN APPROACH: ICD-10-PCS | Performed by: ORTHOPAEDIC SURGERY

## 2018-06-19 PROCEDURE — 77030010788: Performed by: ORTHOPAEDIC SURGERY

## 2018-06-19 PROCEDURE — 77030006822 HC BLD SAW SAG BRSM -B: Performed by: ORTHOPAEDIC SURGERY

## 2018-06-19 DEVICE — FEMORAL SIZE D LT MEDIAL/RT LATERAL
Type: IMPLANTABLE DEVICE | Site: KNEE | Status: FUNCTIONAL
Brand: ZUK

## 2018-06-19 DEVICE — ARTICULAR SURFACE SIZE 5 9MM
Type: IMPLANTABLE DEVICE | Site: KNEE | Status: FUNCTIONAL
Brand: ZUK

## 2018-06-19 DEVICE — SMARTSET GHV GENTAMICIN HIGH VISCOSITY BONE CEMENT 40G
Type: IMPLANTABLE DEVICE | Site: KNEE | Status: FUNCTIONAL
Brand: SMARTSET

## 2018-06-19 DEVICE — SYSTEM PART REPL STD CEM POLY ZUK: Type: IMPLANTABLE DEVICE | Status: FUNCTIONAL

## 2018-06-19 DEVICE — TIBIA BASE SIZE 5 LEFT                                    MEDIAL/RIGHT LATERAL
Type: IMPLANTABLE DEVICE | Site: KNEE | Status: FUNCTIONAL
Brand: ZUK

## 2018-06-19 RX ORDER — SODIUM CHLORIDE, SODIUM LACTATE, POTASSIUM CHLORIDE, CALCIUM CHLORIDE 600; 310; 30; 20 MG/100ML; MG/100ML; MG/100ML; MG/100ML
INJECTION, SOLUTION INTRAVENOUS
Status: DISCONTINUED | OUTPATIENT
Start: 2018-06-19 | End: 2018-06-19 | Stop reason: HOSPADM

## 2018-06-19 RX ORDER — SODIUM CHLORIDE 0.9 % (FLUSH) 0.9 %
5-10 SYRINGE (ML) INJECTION EVERY 8 HOURS
Status: DISCONTINUED | OUTPATIENT
Start: 2018-06-19 | End: 2018-06-19 | Stop reason: HOSPADM

## 2018-06-19 RX ORDER — OXYCODONE HYDROCHLORIDE 5 MG/1
5-10 TABLET ORAL
Status: DISCONTINUED | OUTPATIENT
Start: 2018-06-19 | End: 2018-06-21 | Stop reason: HOSPADM

## 2018-06-19 RX ORDER — FENTANYL CITRATE 50 UG/ML
10 INJECTION, SOLUTION INTRAMUSCULAR; INTRAVENOUS
Status: DISCONTINUED | OUTPATIENT
Start: 2018-06-19 | End: 2018-06-21 | Stop reason: HOSPADM

## 2018-06-19 RX ORDER — DEXAMETHASONE SODIUM PHOSPHATE 4 MG/ML
4 INJECTION, SOLUTION INTRA-ARTICULAR; INTRALESIONAL; INTRAMUSCULAR; INTRAVENOUS; SOFT TISSUE ONCE
Status: DISCONTINUED | OUTPATIENT
Start: 2018-06-19 | End: 2018-06-19 | Stop reason: HOSPADM

## 2018-06-19 RX ORDER — SODIUM CHLORIDE 0.9 % (FLUSH) 0.9 %
5-10 SYRINGE (ML) INJECTION AS NEEDED
Status: DISCONTINUED | OUTPATIENT
Start: 2018-06-19 | End: 2018-06-21 | Stop reason: HOSPADM

## 2018-06-19 RX ORDER — TRAMADOL HYDROCHLORIDE 50 MG/1
50 TABLET ORAL
Status: DISCONTINUED | OUTPATIENT
Start: 2018-06-19 | End: 2018-06-21 | Stop reason: HOSPADM

## 2018-06-19 RX ORDER — DEXTROSE, SODIUM CHLORIDE, SODIUM LACTATE, POTASSIUM CHLORIDE, AND CALCIUM CHLORIDE 5; .6; .31; .03; .02 G/100ML; G/100ML; G/100ML; G/100ML; G/100ML
25 INJECTION, SOLUTION INTRAVENOUS CONTINUOUS
Status: DISCONTINUED | OUTPATIENT
Start: 2018-06-19 | End: 2018-06-19 | Stop reason: HOSPADM

## 2018-06-19 RX ORDER — ONDANSETRON 2 MG/ML
4 INJECTION INTRAMUSCULAR; INTRAVENOUS
Status: ACTIVE | OUTPATIENT
Start: 2018-06-19 | End: 2018-06-20

## 2018-06-19 RX ORDER — CEFAZOLIN SODIUM/WATER 2 G/20 ML
2 SYRINGE (ML) INTRAVENOUS EVERY 8 HOURS
Status: COMPLETED | OUTPATIENT
Start: 2018-06-19 | End: 2018-06-20

## 2018-06-19 RX ORDER — SODIUM CHLORIDE 0.9 % (FLUSH) 0.9 %
5-10 SYRINGE (ML) INJECTION AS NEEDED
Status: DISCONTINUED | OUTPATIENT
Start: 2018-06-19 | End: 2018-06-19 | Stop reason: HOSPADM

## 2018-06-19 RX ORDER — CELECOXIB 200 MG/1
200 CAPSULE ORAL ONCE
Status: COMPLETED | OUTPATIENT
Start: 2018-06-19 | End: 2018-06-19

## 2018-06-19 RX ORDER — CELECOXIB 200 MG/1
200 CAPSULE ORAL 2 TIMES DAILY
Status: DISCONTINUED | OUTPATIENT
Start: 2018-06-19 | End: 2018-06-21 | Stop reason: HOSPADM

## 2018-06-19 RX ORDER — SODIUM CHLORIDE 9 MG/ML
500 INJECTION, SOLUTION INTRAVENOUS ONCE
Status: COMPLETED | OUTPATIENT
Start: 2018-06-19 | End: 2018-06-19

## 2018-06-19 RX ORDER — AMOXICILLIN 250 MG
1 CAPSULE ORAL 2 TIMES DAILY
Status: DISCONTINUED | OUTPATIENT
Start: 2018-06-20 | End: 2018-06-21 | Stop reason: HOSPADM

## 2018-06-19 RX ORDER — FENTANYL CITRATE 50 UG/ML
25 INJECTION, SOLUTION INTRAMUSCULAR; INTRAVENOUS
Status: DISCONTINUED | OUTPATIENT
Start: 2018-06-19 | End: 2018-06-19 | Stop reason: HOSPADM

## 2018-06-19 RX ORDER — ACETAMINOPHEN 500 MG
1000 TABLET ORAL ONCE
Status: COMPLETED | OUTPATIENT
Start: 2018-06-19 | End: 2018-06-19

## 2018-06-19 RX ORDER — ASPIRIN 325 MG
325 TABLET, DELAYED RELEASE (ENTERIC COATED) ORAL 2 TIMES DAILY
Status: DISCONTINUED | OUTPATIENT
Start: 2018-06-19 | End: 2018-06-21 | Stop reason: HOSPADM

## 2018-06-19 RX ORDER — MORPHINE SULFATE 10 MG/ML
2 INJECTION, SOLUTION INTRAMUSCULAR; INTRAVENOUS
Status: DISCONTINUED | OUTPATIENT
Start: 2018-06-19 | End: 2018-06-19 | Stop reason: HOSPADM

## 2018-06-19 RX ORDER — SODIUM CHLORIDE 0.9 % (FLUSH) 0.9 %
5-10 SYRINGE (ML) INJECTION EVERY 8 HOURS
Status: DISCONTINUED | OUTPATIENT
Start: 2018-06-20 | End: 2018-06-21 | Stop reason: HOSPADM

## 2018-06-19 RX ORDER — SODIUM CHLORIDE, SODIUM LACTATE, POTASSIUM CHLORIDE, CALCIUM CHLORIDE 600; 310; 30; 20 MG/100ML; MG/100ML; MG/100ML; MG/100ML
25 INJECTION, SOLUTION INTRAVENOUS CONTINUOUS
Status: DISCONTINUED | OUTPATIENT
Start: 2018-06-19 | End: 2018-06-19 | Stop reason: HOSPADM

## 2018-06-19 RX ORDER — SODIUM CHLORIDE 9 MG/ML
500 INJECTION, SOLUTION INTRAVENOUS CONTINUOUS
Status: DISCONTINUED | OUTPATIENT
Start: 2018-06-19 | End: 2018-06-19

## 2018-06-19 RX ORDER — PROPOFOL 10 MG/ML
INJECTION, EMULSION INTRAVENOUS
Status: DISCONTINUED | OUTPATIENT
Start: 2018-06-19 | End: 2018-06-19 | Stop reason: HOSPADM

## 2018-06-19 RX ORDER — NALOXONE HYDROCHLORIDE 0.4 MG/ML
0.4 INJECTION, SOLUTION INTRAMUSCULAR; INTRAVENOUS; SUBCUTANEOUS AS NEEDED
Status: DISCONTINUED | OUTPATIENT
Start: 2018-06-19 | End: 2018-06-21 | Stop reason: HOSPADM

## 2018-06-19 RX ORDER — LIDOCAINE HYDROCHLORIDE 10 MG/ML
0.1 INJECTION, SOLUTION EPIDURAL; INFILTRATION; INTRACAUDAL; PERINEURAL AS NEEDED
Status: DISCONTINUED | OUTPATIENT
Start: 2018-06-19 | End: 2018-06-19 | Stop reason: HOSPADM

## 2018-06-19 RX ORDER — POLYETHYLENE GLYCOL 3350 17 G/17G
17 POWDER, FOR SOLUTION ORAL DAILY
Status: DISCONTINUED | OUTPATIENT
Start: 2018-06-20 | End: 2018-06-21 | Stop reason: HOSPADM

## 2018-06-19 RX ORDER — CEFAZOLIN SODIUM/WATER 2 G/20 ML
2 SYRINGE (ML) INTRAVENOUS ONCE
Status: COMPLETED | OUTPATIENT
Start: 2018-06-19 | End: 2018-06-19

## 2018-06-19 RX ORDER — MIDAZOLAM HYDROCHLORIDE 1 MG/ML
1 INJECTION, SOLUTION INTRAMUSCULAR; INTRAVENOUS AS NEEDED
Status: DISCONTINUED | OUTPATIENT
Start: 2018-06-19 | End: 2018-06-19 | Stop reason: HOSPADM

## 2018-06-19 RX ORDER — DIPHENHYDRAMINE HCL 12.5MG/5ML
12.5 ELIXIR ORAL
Status: ACTIVE | OUTPATIENT
Start: 2018-06-19 | End: 2018-06-20

## 2018-06-19 RX ORDER — FACIAL-BODY WIPES
10 EACH TOPICAL DAILY PRN
Status: DISCONTINUED | OUTPATIENT
Start: 2018-06-21 | End: 2018-06-21 | Stop reason: HOSPADM

## 2018-06-19 RX ORDER — SODIUM CHLORIDE 9 MG/ML
125 INJECTION, SOLUTION INTRAVENOUS CONTINUOUS
Status: DISPENSED | OUTPATIENT
Start: 2018-06-19 | End: 2018-06-20

## 2018-06-19 RX ORDER — ONDANSETRON 2 MG/ML
4 INJECTION INTRAMUSCULAR; INTRAVENOUS AS NEEDED
Status: DISCONTINUED | OUTPATIENT
Start: 2018-06-19 | End: 2018-06-19 | Stop reason: HOSPADM

## 2018-06-19 RX ORDER — DIPHENHYDRAMINE HYDROCHLORIDE 50 MG/ML
12.5 INJECTION, SOLUTION INTRAMUSCULAR; INTRAVENOUS AS NEEDED
Status: DISCONTINUED | OUTPATIENT
Start: 2018-06-19 | End: 2018-06-19 | Stop reason: HOSPADM

## 2018-06-19 RX ORDER — FENTANYL CITRATE 50 UG/ML
50 INJECTION, SOLUTION INTRAMUSCULAR; INTRAVENOUS AS NEEDED
Status: DISCONTINUED | OUTPATIENT
Start: 2018-06-19 | End: 2018-06-19 | Stop reason: HOSPADM

## 2018-06-19 RX ORDER — ACETAMINOPHEN 500 MG
1000 TABLET ORAL EVERY 6 HOURS
Status: DISCONTINUED | OUTPATIENT
Start: 2018-06-19 | End: 2018-06-21 | Stop reason: HOSPADM

## 2018-06-19 RX ORDER — MIDAZOLAM HYDROCHLORIDE 1 MG/ML
INJECTION, SOLUTION INTRAMUSCULAR; INTRAVENOUS AS NEEDED
Status: DISCONTINUED | OUTPATIENT
Start: 2018-06-19 | End: 2018-06-19 | Stop reason: HOSPADM

## 2018-06-19 RX ORDER — SODIUM CHLORIDE 9 MG/ML
1000 INJECTION, SOLUTION INTRAVENOUS CONTINUOUS
Status: DISCONTINUED | OUTPATIENT
Start: 2018-06-19 | End: 2018-06-19 | Stop reason: HOSPADM

## 2018-06-19 RX ORDER — DEXMEDETOMIDINE HYDROCHLORIDE 4 UG/ML
INJECTION, SOLUTION INTRAVENOUS AS NEEDED
Status: DISCONTINUED | OUTPATIENT
Start: 2018-06-19 | End: 2018-06-19 | Stop reason: HOSPADM

## 2018-06-19 RX ORDER — SODIUM CHLORIDE 9 MG/ML
25 INJECTION, SOLUTION INTRAVENOUS CONTINUOUS
Status: DISCONTINUED | OUTPATIENT
Start: 2018-06-19 | End: 2018-06-19 | Stop reason: HOSPADM

## 2018-06-19 RX ORDER — MIDAZOLAM HYDROCHLORIDE 1 MG/ML
0.5 INJECTION, SOLUTION INTRAMUSCULAR; INTRAVENOUS
Status: DISCONTINUED | OUTPATIENT
Start: 2018-06-19 | End: 2018-06-19 | Stop reason: HOSPADM

## 2018-06-19 RX ADMIN — CELECOXIB 200 MG: 200 CAPSULE ORAL at 06:42

## 2018-06-19 RX ADMIN — DEXMEDETOMIDINE HYDROCHLORIDE 20 MCG: 4 INJECTION, SOLUTION INTRAVENOUS at 12:15

## 2018-06-19 RX ADMIN — SODIUM CHLORIDE, SODIUM LACTATE, POTASSIUM CHLORIDE, CALCIUM CHLORIDE: 600; 310; 30; 20 INJECTION, SOLUTION INTRAVENOUS at 12:15

## 2018-06-19 RX ADMIN — PROPOFOL 50 MCG/KG/MIN: 10 INJECTION, EMULSION INTRAVENOUS at 12:12

## 2018-06-19 RX ADMIN — Medication 2 G: at 21:01

## 2018-06-19 RX ADMIN — OXYCODONE HYDROCHLORIDE 5 MG: 5 TABLET ORAL at 21:16

## 2018-06-19 RX ADMIN — ACETAMINOPHEN 1000 MG: 500 TABLET, FILM COATED ORAL at 06:42

## 2018-06-19 RX ADMIN — SODIUM CHLORIDE 500 ML: 900 INJECTION, SOLUTION INTRAVENOUS at 19:00

## 2018-06-19 RX ADMIN — ACETAMINOPHEN 1000 MG: 500 TABLET, FILM COATED ORAL at 23:48

## 2018-06-19 RX ADMIN — SODIUM CHLORIDE 125 ML/HR: 900 INJECTION, SOLUTION INTRAVENOUS at 20:16

## 2018-06-19 RX ADMIN — ASPIRIN 325 MG: 325 TABLET, DELAYED RELEASE ORAL at 17:39

## 2018-06-19 RX ADMIN — MIDAZOLAM HYDROCHLORIDE 2 MG: 1 INJECTION, SOLUTION INTRAMUSCULAR; INTRAVENOUS at 12:00

## 2018-06-19 RX ADMIN — MIDAZOLAM HYDROCHLORIDE 3 MG: 1 INJECTION, SOLUTION INTRAMUSCULAR; INTRAVENOUS at 11:16

## 2018-06-19 RX ADMIN — DEXMEDETOMIDINE HYDROCHLORIDE 20 MCG: 4 INJECTION, SOLUTION INTRAVENOUS at 12:30

## 2018-06-19 RX ADMIN — Medication 2 G: at 12:29

## 2018-06-19 RX ADMIN — CELECOXIB 200 MG: 200 CAPSULE ORAL at 17:39

## 2018-06-19 RX ADMIN — SODIUM CHLORIDE 500 ML/HR: 900 INJECTION, SOLUTION INTRAVENOUS at 19:00

## 2018-06-19 RX ADMIN — SODIUM CHLORIDE, POTASSIUM CHLORIDE, SODIUM LACTATE AND CALCIUM CHLORIDE 25 ML/HR: 600; 310; 30; 20 INJECTION, SOLUTION INTRAVENOUS at 07:00

## 2018-06-19 RX ADMIN — FENTANYL CITRATE 50 MCG: 50 INJECTION, SOLUTION INTRAMUSCULAR; INTRAVENOUS at 11:16

## 2018-06-19 RX ADMIN — TRAMADOL HYDROCHLORIDE 50 MG: 50 TABLET, FILM COATED ORAL at 22:46

## 2018-06-19 RX ADMIN — ACETAMINOPHEN 1000 MG: 500 TABLET, FILM COATED ORAL at 17:39

## 2018-06-19 RX ADMIN — FENTANYL CITRATE 10 MCG: 50 INJECTION, SOLUTION INTRAMUSCULAR; INTRAVENOUS at 23:48

## 2018-06-19 RX ADMIN — SODIUM CHLORIDE 125 ML/HR: 900 INJECTION, SOLUTION INTRAVENOUS at 17:35

## 2018-06-19 NOTE — PROGRESS NOTES
TRANSFER - IN REPORT:    Verbal report received from [employer]) on [employer]  being received from [employer]) for routine post - op      Report consisted of patients Situation, Background, Assessment and   Recommendations(SBAR). Information from the following report(s) SBAR, Kardex, Intake/Output, MAR and Recent Results was reviewed with the receiving nurse. Opportunity for questions and clarification was provided. Assessment completed upon patients arrival to unit and care assumed.

## 2018-06-19 NOTE — IP AVS SNAPSHOT
Summary of Care Report The Summary of Care report has been created to help improve care coordination. Users with access to [employer] or [address] (Web-based application) may access additional patient information including the Discharge Summary. If you are not currently a [address] user and need more information, please call the number listed below in the [place] [address] section and ask to be connected with Medical Records. Facility Information Name Address Phone [address] [facility] 7 [address] [phone] Patient Information Patient Name Sex  Sebastian Pan ([id number]) Male 1965 Discharge Information Admitting Provider Service Area Unit Migdalia Stone MD / [address] / [phone] Discharge Provider Discharge Date/Time Discharge Disposition Destination (none) 2018 Afternoon (Pending) [facility] (none) Patient Language Language ENGLISH [13] Hospital Problems as of 2018  Reviewed: 2018  9:34 AM by Neetu West NP Class Noted - Resolved Last Modified POA Active Problems * (Principal)Osteoarthritis of right knee  2018 - Present 2018 by Neetu West NP Yes Entered by Migdalia Stone MD  
  Arthritis of knee  2018 - Present 2018 by Migdalia Stone MD Unknown Entered by Migdalia Stone MD  
  
Non-Hospital Problems as of 2018  Reviewed: 2018  9:34 AM by Neetu West NP Class Noted - Resolved Last Modified Active Problems Pulmonary emboli ([address]  3/28/2017 - Present 3/28/2017 by Rich Nelson MD  
  Entered by Rich Nelson MD  
  Deep venous thrombosis of lower extremity ([address]  3/28/2017 - Present 2017 by Jailene Collado   Entered by Rich Nelson MD  
 Elevated troponin  3/28/2017 - Present 3/28/2017 by Sukhwinder Hathaway MD  
  Entered by Sukhwinder Hathaway MD  
  Acute embolism and thrombosis of deep vein of left distal lower extremity ([facility]  5/2/2017 - Present 5/11/2017 by Sri Polo Entered by Jackie Horn MD  
  Acute pulmonary embolism ([address]  5/2/2017 - Present 5/2/2017 by Jackie Horn MD  
  Entered by Jackie Horn MD  
  Post-phlebitic syndrome  5/2/2017 - Present 5/2/2017 by Jackie Horn MD  
  Entered by Jackie Horn MD  
  Chronic deep vein thrombosis (DVT) of popliteal vein of left lower extremity ([facility]  6/30/2017 - Present 6/30/2017 by Jackie Horn MD  
  Entered by Jackie Horn MD  
  
You are allergic to the following No active allergies Current Discharge Medication List  
  
START taking these medications Dose & Instructions Dispensing Information Comments  
 oxyCODONE IR 5 mg immediate release tablet Commonly known as:  Chares Marla Dose:  5-10 mg Take 1-2 Tabs by mouth every four (4) hours as needed. Max Daily Amount: 60 mg.  
 Quantity:  80 Tab Refills:  0  
   
 rivaroxaban 10 mg tablet Commonly known as:  Radha Mckeonin Dose:  10 mg Take 1 Tab by mouth daily (with breakfast). Indications: KNEE REPLACEMENT DEEP VEIN THROMBOSIS PREVENTION Quantity:  30 Tab Refills:  1 CONTINUE these medications which have NOT CHANGED Dose & Instructions Dispensing Information Comments  
 ginkgo biloba leaf extract 120 mg Cap Dose:  1 Cap Take 1 Cap by mouth daily. Refills:  0 STOP taking these medications Comments ADVIL 200 mg tablet Generic drug:  ibuprofen  
   
   
 aspirin delayed-release 81 mg tablet Surgery Information ID Date/Time Status Primary Surgeon All Procedures Location [id number] 6/19/2018 Wendy Tsai MD RIGHT UNICONDYLAR KNEE [facility] MAIN OR Follow-up Information Follow up With Details Comments Contact Info AT [facility] skilled nursing and physical therapy. [address] [place] [address] 
[phone] None   None (395) Patient stated that they have no PCP Discharge Instructions Discharge Instructions Unicondylar Knee Replacement Dr. Amy Weems Patient Name: Luc Cid Date of procedure: 6/19/2018 Procedure: Procedure(s): RIGHT UNICONDYLAR KNEE Surgeon: Surgeon(s) and Role: Kimberly Salter MD - Primary PCP: None Date of discharge: No discharge date for patient encounter. Follow up appointments ? Follow up with Dr. Amy Weems in 3 weeks. Call [phone] to make an appointment. ? If home health has been arranged for you the agency will contact you to arrange dates/times for visits. Please call them if you do not hear from them within 24 hours after you are discharged When to call your Orthopaedic Surgeon: Call [phone]. If you call after 5pm or on a weekend, the on call physician will be contacted ? Unrelieved pain ? Signs of infection-if your incision is red; continues to have drainage; drainage has a foul odor or if you have a persistent fever over 101 degrees ? Signs of a blood clot in your leg-calf pain, tenderness, redness, swelling of lower leg When to call your Primary Care Physician: 
? Concerns about medical conditions such as diabetes, high blood pressure, asthma, congestive heart failure ? Call if blood sugars are elevated, persistent headache or dizziness, coughing or congestion, constipation or diarrhea, burning with urination, abnormal heart rate When to call 941als go to the nearest emergency room ? Acute onset of chest pain, shortness of breath, difficulty breathing Activity ? Weight bearing as tolerated with walker or crutches. Refer to pages 23-31 of your handbook for instructions and pictures ? Complete your Home Exercise Program daily as instructed by your therapist.  Refer to pages 33-41 of your handbook for instructions and pictures ? Get up every one hour and walk (except at night when sleeping) ? Do not drive or operate heavy machinery Incision Care ? The Aquacel (brown, waterproof) surgical dressing is to remain on your knee for 7 days. On the 7th day have someone gently peel the dressing off by carefully lifting the edge and stretching it slightly to break the adhesive seal and leave incision open to air. You may take a shower with the Aquacel dressing in place. ? You do have staples in your knee incision; if present they will be removed by the [address] [place] [address] [facility] staff in 10-14 days and steri-strips will be applied. Leave the steri-strips on until they fall off Preventing blood clots ? Take Aspirin as prescribed. Pain management ? Take pain medication as prescribed; decrease the amount you use as your pain lessens ? Avoid alcoholic beverages while taking pain medication ? Do not take any over-the-counter medication for pain except Tylenol (acetaminophen) ? Please be aware that many medications contain Tylenol. We do not want you to over medicate so please read the information below as a guide. Do not take more than 3 Grams of Tylenol in a 24 hour period. (There are 1000 milligrams in one Gram) 
o 325 mg of Tylenol per tablet (do not take more than 9 tablets in 24 hours) 
o 500 mg of Tylenol per tablet (do not take more than 6 tablets in 24 hours) ? You may place an ice bag on your knee for 15-20 minutes after exercising and as needed throughout the day and night Diet ? Resume usual diet; drink plenty of fluids; eat foods high in fiber ? You may want to take a stool softener (such as Senokot-S or Colace) to prevent constipation while you are taking pain medication. If constipation occurs, take a laxative (such as Dulcolax tablets, Milk of Magnesia, or a suppository) Chart Review Routing History No Routing History on File

## 2018-06-19 NOTE — PROGRESS NOTES
Bedside and Verbal shift change report given to Leah Hartmann (oncoming nurse) by Edyta Norwood (offgoing nurse). Report included the following information SBAR, Kardex, Intake/Output, MAR and Recent Results.

## 2018-06-19 NOTE — PERIOP NOTES
1115 Time out performed for an adductor canal nerve block. Patient was able to communicate with nurse and anesthesiologist during procedure. Patient on monitor and post vitals were taken. Vitals stable. Will continue to monitor.

## 2018-06-19 NOTE — ANESTHESIA PREPROCEDURE EVALUATION
Anesthetic History   No history of anesthetic complications  PONV          Review of Systems / Medical History  Patient summary reviewed, nursing notes reviewed and pertinent labs reviewed    Pulmonary  Within defined limits                 Neuro/Psych   Within defined limits           Cardiovascular  Within defined limits                     GI/Hepatic/Renal  Within defined limits              Endo/Other        Obesity     Other Findings              Physical Exam    Airway  Mallampati: III  TM Distance: > 6 cm  Neck ROM: normal range of motion   Mouth opening: Normal     Cardiovascular  Regular rate and rhythm,  S1 and S2 normal,  no murmur, click, rub, or gallop             Dental  No notable dental hx       Pulmonary  Breath sounds clear to auscultation               Abdominal  GI exam deferred       Other Findings            Anesthetic Plan    ASA: 2  Anesthesia type: spinal          Induction: Intravenous  Anesthetic plan and risks discussed with: Patient

## 2018-06-19 NOTE — IP AVS SNAPSHOT
[address] [facility] 13 
[phone] Patient: Kamron Gama MRN: [id number] MYE:[id number]/[id number] About your hospitalization You were admitted on:  June 19, 2018 You last received care in the:  [address] [place] [address] You were discharged on:  June 21, 2018 Why you were hospitalized Your primary diagnosis was:  Osteoarthritis Of Right Knee Your diagnoses also included: Arthritis Of Knee Follow-up Information Follow up With Details Comments Contact Info AT [facility] nursing and physical therapy. [address] [place] [address] [phone] None   None (395) Patient stated that they have no PCP Discharge Orders None A check david indicates which time of day the medication should be taken. My Medications START taking these medications Instructions Each Dose to Equal  
 Morning Noon Evening Bedtime  
 oxyCODONE IR 5 mg immediate release tablet Commonly known as:  Nakul Jerome Your last dose was: Your next dose is: Take 1-2 Tabs by mouth every four (4) hours as needed. Max Daily Amount: 60 mg.  
 5-10 mg  
    
   
   
   
  
 rivaroxaban 10 mg tablet Commonly known as:  Prem Fernandez Your last dose was: Your next dose is: Take 1 Tab by mouth daily (with breakfast). Indications: KNEE REPLACEMENT DEEP VEIN THROMBOSIS PREVENTION  
 10 mg CONTINUE taking these medications Instructions Each Dose to Equal  
 Morning Noon Evening Bedtime  
 ginkgo biloba leaf extract 120 mg Cap Your last dose was: Your next dose is: Take 1 Cap by mouth daily. 1 Cap STOP taking these medications ADVIL 200 mg tablet Generic drug:  ibuprofen  
   
  
 aspirin delayed-release 81 mg tablet Where to Get Your Medications Information on where to get these meds will be given to you by the nurse or doctor. ! Ask your nurse or doctor about these medications  
  oxyCODONE IR 5 mg immediate release tablet  
 rivaroxaban 10 mg tablet Opioid Education Prescription Opioids: What You Need to Know: 
 
Prescription opioids can be used to help relieve moderate-to-severe pain and are often prescribed following a surgery or injury, or for certain health conditions. These medications can be an important part of treatment but also come with serious risks. Opioids are strong pain medicines. Examples include hydrocodone, oxycodone, fentanyl, and morphine. Heroin is an example of an illegal opioid. It is important to work with your health care provider to make sure you are getting the safest, most effective care. WHAT ARE THE RISKS AND SIDE EFFECTS OF OPIOID USE? Prescription opioids carry serious risks of addiction and overdose, especially with prolonged use. An opioid overdose, often marked by slow breathing, can cause sudden death. The use of prescription opioids can have a number of side effects as well, even when taken as directed. · Tolerance-meaning you might need to take more of a medication for the same pain relief · Physical dependence-meaning you have symptoms of withdrawal when the medication is stopped. Withdrawal symptoms can include nausea, sweating, chills, diarrhea, stomach cramps, and muscle aches. Withdrawal can last up to several weeks, depending on which drug you took and how long you took it. · Increased sensitivity to pain · Constipation · Nausea, vomiting, and dry mouth · Sleepiness and dizziness · Confusion · Depression · Low levels of testosterone that can result in lower sex drive, energy, and strength · Itching and sweating RISKS ARE GREATER WITH:      
· History of drug misuse, substance use disorder, or overdose · Mental health conditions (such as depression or anxiety) · Sleep apnea · Older age (72 years or older) · Pregnancy Avoid alcohol while taking prescription opioids. Also, unless specifically advised by your health care provider, medications to avoid include: · Benzodiazepines (such as Xanax or Valium) · Muscle relaxants (such as Soma or Flexeril) · Hypnotics (such as Ambien or Lunesta) · Other prescription opioids KNOW YOUR OPTIONS Talk to your health care provider about ways to manage your pain that don't involve prescription opioids. Some of these options may actually work better and have fewer risks and side effects. Options may include: 
· Pain relievers such as acetaminophen, ibuprofen, and naproxen · Some medications that are also used for depression or seizures · Physical therapy and exercise · Counseling to help patients learn how to cope better with triggers of pain and stress. · Application of heat or cold compress · Massage therapy · Relaxation techniques Be Informed Make sure you know the name of your medication, how much and how often to take it, and its potential risks & side effects. IF YOU ARE PRESCRIBED OPIOIDS FOR PAIN: 
· Never take opioids in greater amounts or more often than prescribed. Remember the goal is not to be pain-free but to manage your pain at a tolerable level. · Follow up with your primary care provider to: · Work together to create a plan on how to manage your pain. · Talk about ways to help manage your pain that don't involve prescription opioids. · Talk about any and all concerns and side effects. · Help prevent misuse and abuse. · Never sell or share prescription opioids · Help prevent misuse and abuse. · Store prescription opioids in a secure place and out of reach of others (this may include visitors, children, friends, and family).  
· Safely dispose of unused/unwanted prescription opioids: Find your community drug take-back program or your pharmacy mail-back program, or flush them down the toilet, following guidance from the Food and Drug Administration (www.fda.gov/Drugs/ResourcesForYou). · Visit www.cdc.gov/drugoverdose to learn about the risks of opioid abuse and overdose. · If you believe you may be struggling with addiction, tell your health care provider and ask for guidance or call [employer] at 7-859-878-IWKA. Discharge Instructions Discharge Instructions Unicondylar Knee Replacement Dr. Rashid Magaña Patient Name: Rodrigo Aldana Date of procedure: 6/19/2018 Procedure: Procedure(s): RIGHT UNICONDYLAR KNEE Surgeon: Surgeon(s) and Role: Learta Scheuermann, MD - Primary PCP: None Date of discharge: No discharge date for patient encounter. Follow up appointments ? Follow up with Dr. Rashid Magaña in 3 weeks. Call [phone] to make an appointment. ? If home health has been arranged for you the agency will contact you to arrange dates/times for visits. Please call them if you do not hear from them within 24 hours after you are discharged When to call your Orthopaedic Surgeon: Call [phone]. If you call after 5pm or on a weekend, the on call physician will be contacted ? Unrelieved pain ? Signs of infection-if your incision is red; continues to have drainage; drainage has a foul odor or if you have a persistent fever over 101 degrees ? Signs of a blood clot in your leg-calf pain, tenderness, redness, swelling of lower leg When to call your Primary Care Physician: 
? Concerns about medical conditions such as diabetes, high blood pressure, asthma, congestive heart failure ? Call if blood sugars are elevated, persistent headache or dizziness, coughing or congestion, constipation or diarrhea, burning with urination, abnormal heart rate When to call [id number] go to the nearest emergency room ? Acute onset of chest pain, shortness of breath, difficulty breathing Activity ? Weight bearing as tolerated with walker or crutches. Refer to pages 23-31 of your handbook for instructions and pictures ? Complete your Home Exercise Program daily as instructed by your therapist.  Refer to pages 33-41 of your handbook for instructions and pictures ? Get up every one hour and walk (except at night when sleeping) ? Do not drive or operate heavy machinery Incision Care ? The Aquacel (brown, waterproof) surgical dressing is to remain on your knee for 7 days. On the 7th day have someone gently peel the dressing off by carefully lifting the edge and stretching it slightly to break the adhesive seal and leave incision open to air. You may take a shower with the Aquacel dressing in place. ? You do have staples in your knee incision; if present they will be removed by the [address] [place] [address] [facility] staff in 10-14 days and steri-strips will be applied. Leave the steri-strips on until they fall off Preventing blood clots ? Take Aspirin as prescribed. Pain management ? Take pain medication as prescribed; decrease the amount you use as your pain lessens ? Avoid alcoholic beverages while taking pain medication ? Do not take any over-the-counter medication for pain except Tylenol (acetaminophen) ? Please be aware that many medications contain Tylenol. We do not want you to over medicate so please read the information below as a guide. Do not take more than 3 Grams of Tylenol in a 24 hour period. (There are 1000 milligrams in one Gram) 
o 325 mg of Tylenol per tablet (do not take more than 9 tablets in 24 hours) 
o 500 mg of Tylenol per tablet (do not take more than 6 tablets in 24 hours) ? You may place an ice bag on your knee for 15-20 minutes after exercising and as needed throughout the day and night Diet ? Resume usual diet; drink plenty of fluids; eat foods high in fiber ?  You may want to take a stool softener (such as Senokot-S or Colace) to prevent constipation while you are taking pain medication. If constipation occurs, take a laxative (such as Dulcolax tablets, Milk of Magnesia, or a suppository) [employer] Announcement We are excited to announce that we are making your provider's discharge notes available to you in [employer]. You will see these notes when they are completed and signed by the physician that discharged you from your recent hospital stay. If you have any questions or concerns about any information you see in [employer], please call the Health Information Department where you were seen or reach out to your Primary Care Provider for more information about your plan of care. Introducing [facility]! Dear Marilu Ervin: Thank you for requesting a [employer] account. Our records indicate that you already have an active [employer] account. You can access your account anytime at [email or web address]. [employer] Did you know that you can access your hospital and ER discharge instructions at any time in [employer]? You can also review all of your test results from your hospital stay or ER visit. Additional Information If you have questions, please visit the Frequently Asked Questions section of the [employer] website at [email or web address] Remember, [employer] is NOT to be used for urgent needs. For medical emergencies, dial 911. Now available from your iPhone and Android! Introducing Josh Maguire As a [facility] patient, I wanted to make you aware of our electronic visit tool called Josh Maguire. [facility] 24/7 allows you to connect within minutes with a medical provider 24 hours a day, seven days a week via a mobile device or tablet or logging into a secure website from your computer. You can access Josh Maguire from anywhere in the [place].  
 
A virtual visit might be right for you when you have a simple condition and feel like you just dont want to get out of bed, or cant get away from work for an appointment, when your regular [employer] provider is not available (evenings, weekends or holidays), or when youre out of town and need minor care. Electronic visits cost only $49 and if the [employer] 24/7 provider determines a prescription is needed to treat your condition, one can be electronically transmitted to a nearby pharmacy*. Please take a moment to enroll today if you have not already done so. The enrollment process is free and takes just a few minutes. To enroll, please download the [employer] 24/7 leeanne to your tablet or phone, or visit [email or web address] to enroll on your computer. And, as an [address] [place] [address] patient with a [employer] account, the results of your visits will be scanned into your electronic medical record and your primary care provider will be able to view the scanned results. We urge you to continue to see your regular [employer] provider for your ongoing medical care. And while your primary care provider may not be the one available when you seek a [employer] virtual visit, the peace of mind you get from getting a real diagnosis real time can be priceless. For more information on [employer], view our Frequently Asked Questions (FAQs) at [email or web address]. Sincerely, 
 
Valentino Milks, MD 
Chief Medical Officer [employer] *:  certain medications cannot be prescribed via [email or web address] Unresulted tests-please follow up with your PCP on these results Procedure/Test Authorizing Provider HEMOGLOBIN Angelica Isbell, [address] [place] [address]  
 METABOLIC PANEL, BASIC [facility] [place], [place] Providers Seen During Your Hospitalization Provider Specialty Primary office phone Rogelio Paz MD Orthopedic Surgery [phone] Your Primary Care Physician (PCP) Primary Care Physician Office Phone Office Fax NONE ** None ** ** None ** You are allergic to the following No active allergies Recent Documentation Weight BMI Smoking Status 101.4 kg 29.5 kg/m2 Never Smoker Emergency Contacts Name Discharge Info Relation Home Work Mobile Aaron Soni DISCHARGE CAREGIVER [3] Brother [24] [phone] Patient Belongings The following personal items are in your possession at time of discharge: 
  Dental Appliances: None  Visual Aid: Glasses, At bedside             Clothing: Other (comment) (1 bag of clothes returned) Please provide this summary of care documentation to your next provider. Signatures-by signing, you are acknowledging that this After Visit Summary has been reviewed with you and you have received a copy. Patient Signature:  ____________________________________________________________ Date:  ____________________________________________________________  
  
Shanti Andersenm Provider Signature:  ____________________________________________________________ Date:  ____________________________________________________________

## 2018-06-19 NOTE — PERIOP NOTES
TRANSFER - OUT REPORT:    Verbal report given to Walter Soriano on Karely Meehan  being transferred to [phone] for routine post - op       Report consisted of patients Situation, Background, Assessment and   Recommendations(SBAR). Time Pre op antibiotic given:1229  Anesthesia Stop time: 4301  Nicolas Present on Transfer to floor:no  Order for Nicolas on Chart:no  Discharge Prescriptions with Chart:no    Information from the following report(s) SBAR, OR Summary, Intake/Output and MAR was reviewed with the receiving nurse. Opportunity for questions and clarification was provided. Is the patient on 02? NO     Is the patient on a monitor? NO    Is the nurse transporting with the patient? NO    Surgical Waiting Area notified of patient's transfer from PACU?  YES      The following personal items collected during your admission accompanied patient upon transfer:   Dental Appliance: Dental Appliances: None  Vision: Visual Aid: Glasses (Dad has glasses)  Hearing Aid:    Jewelry:    Clothing: Clothing: Other (comment) (1 bag of clothes returned)  Other Valuables:    Valuables sent to safe:

## 2018-06-19 NOTE — ANESTHESIA PROCEDURE NOTES
Spinal Block    Performed by: Kaleigh Bledsoe  Authorized by: Amelia Mendez     Pre-procedure: Indications: primary anesthetic  Preanesthetic Checklist: patient identified and risks and benefits discussed      Spinal Block:   Patient Position:  Seated        Location:  L2-3          Needle:   Needle Type:  Pencan  Needle Gauge:  24 G  Attempts:  1      Events: CSF confirmed, no blood with aspiration and no paresthesia        Assessment:  Insertion:  Uncomplicated    Marcaine 5.7% 12mg with no epi.

## 2018-06-19 NOTE — PERIOP NOTES
8501 patient surgery delayed due to equipment for the case not ready, order or cases switched around. Patient family was notified of delay.

## 2018-06-19 NOTE — OP NOTES
[address] [place] [address] REPORT    Name:DOW, Daune Koyanagi  MR#: [id number]  : 1965  ACCOUNT #: [de-identified]   DATE OF SERVICE: 2018    PREOPERATIVE DIAGNOSIS:  Right knee lateral compartment osteoarthritis. POSTOPERATIVE DIAGNOSIS:  Right knee lateral compartment osteoarthritis. PROCEDURE PERFORMED:  Right knee lateral unicompartmental arthroplasty. SURGEON:  Delon Emerson MD     ASSISTANT:  Elmer Michaud. ANESTHESIA:  Spinal.    ESTIMATED BLOOD LOSS:  Minimal.    COMPLICATIONS:  None. SPECIMENS REMOVED:  None. PREOPERATIVE ANTIBIOTIC:  Ancef 2 grams. IMPLANTS:  [employer] ZUK unicompartmental arthroplasty. INDICATIONS:  This is a 50-year-old man with persistent complaints of lateral knee pain. Several years ago he underwent partial lateral meniscectomy. He has had progressive symptoms. Last set of x-rays showed bone on bone in the lateral compartment and he presents today now for elective surgery secondary to the severity of the pain and failure to respond to nonoperative modalities. PROCEDURE:  Anesthetic was initiated. IV antibiotic dose was given. The right side was confirmed as the operative side, prepped and draped in the usual sterile fashion. The skin was covered with [place] Crooked . The limb was exsanguinated, tourniquet was inflated. A midline incision was made and then a lateral arthrotomy was made. His knee was examined. Some minimal changes in the patellofemoral joint. The medial joint was normal.  The lateral joint had severe bone-on-bone osteoarthritis. Osteophytes were removed. The tibial extramedullary alignment guide was used to make a neutral proximal tibial cut matching the patient's native slope. The osteotomy was removed. The flexion and extension gaps were examined. The flexion gap was about 10 mm and the extension gap was about 8 mm with typical deficiency of the lateral femoral condyle.   The femur was cut parallel to the tibia using spacer block technique. A alisa was then placed on the posterior foot of the femoral finishing guide to reduce the flexion gap, and the femur was finished for a size D femur with the rotation based on the bony landmarks and creation of a rectangular flexion gap. Femur was finished for a D. The meniscal remnant was removed. There was a large posterior loose body that was identified and removed. Posterior osteophyte was removed from the femur. The tibia was prepared for a size 5 tibial component and the trials were placed. A 9 polyethylene balanced the knee well in both flexion and extension without over correction into a varus alignment. At this point, the trials were removed. Bony surfaces were lavaged and dried. I cemented the implants. Cement was allowed to fully cure with no motion with the real 9 polyethylene implant in place. After the cement had fully cured, the knee was ranged, irrigated copiously with pulsatile lavage. Soft tissues were infiltrated with local anesthetic. After final irrigation, the arthrotomy was closed with absorbable Vicryl sutures. Skin and subq were irrigated and closed in standard fashion. Sterile dressing was applied. There were no complications. No specimen.       MD NIKO Galvez / [place]  D: 06/19/2018 18:07     T: 06/19/2018 18:52  JOB #: [id number]

## 2018-06-19 NOTE — ANESTHESIA PROCEDURE NOTES
Peripheral Block    Start time: 6/19/2018 11:15 AM  End time: 6/19/2018 11:25 AM  Performed by: Alfonzo Albarran  Authorized by: Alfonzo Albarran       Pre-procedure: Indications: at surgeon's request and post-op pain management    Preanesthetic Checklist: patient identified, risks and benefits discussed, site marked, timeout performed and patient being monitored      Block Type:   Block Type:   Adductor canal  Laterality:  Right  Monitoring:  Standard ASA monitoring, continuous pulse ox, frequent vital sign checks, heart rate, responsive to questions and oxygen  Injection Technique:  Single shot  Procedures: ultrasound guided    Patient Position: supine  Prep: DuraPrep    Location:  Mid thigh  Needle Type:  Stimuplex  Needle Gauge:  22 G  Needle Localization:  Ultrasound guidance  Medication Injected:  0.5%  ropivacaine  Volume (mL):  30    Assessment:  Number of attempts:  1  Injection Assessment:  Incremental injection every 5 mL, local visualized surrounding nerve on ultrasound, negative aspiration for blood, no paresthesia and no intravascular symptoms  Patient tolerance:  Patient tolerated the procedure well with no immediate complications

## 2018-06-19 NOTE — PROGRESS NOTES
Physical Therapy  6.19.18    17:00-- F/u with patient who had no great toe extension and without DF/PF. Unsafe for mobilization with PT at this time. PT to evaluate tomorrow. Recommend mobility this evening with RN assist x2. Order received, chart reviewed. Patient remains without adequate motor function to safely participate in PT evaluation at this time. F/u later this PM to determine if patient is appropriate for PT evaluation. Thank you.     Beverly Murphy , PT, DPT

## 2018-06-19 NOTE — IP AVS SNAPSHOT
[address] 
[phone] Patient: Luc Cid MRN: [id number] ETHEL:[birth year] A check david indicates which time of day the medication should be taken. My Medications START taking these medications Instructions Each Dose to Equal  
 Morning Noon Evening Bedtime  
 oxyCODONE IR 5 mg immediate release tablet Commonly known as:  Huseyin Lowe Your last dose was: Your next dose is: Take 1-2 Tabs by mouth every four (4) hours as needed. Max Daily Amount: 60 mg.  
 5-10 mg  
    
   
   
   
  
 rivaroxaban 10 mg tablet Commonly known as:  Shari Garcia Your last dose was: Your next dose is: Take 1 Tab by mouth daily (with breakfast). Indications: KNEE REPLACEMENT DEEP VEIN THROMBOSIS PREVENTION  
 10 mg CONTINUE taking these medications Instructions Each Dose to Equal  
 Morning Noon Evening Bedtime  
 ginkgo biloba leaf extract 120 mg Cap Your last dose was: Your next dose is: Take 1 Cap by mouth daily. 1 Cap STOP taking these medications ADVIL 200 mg tablet Generic drug:  ibuprofen  
   
  
 aspirin delayed-release 81 mg tablet Where to Get Your Medications Information on where to get these meds will be given to you by the nurse or doctor. ! Ask your nurse or doctor about these medications  
  oxyCODONE IR 5 mg immediate release tablet  
 rivaroxaban 10 mg tablet

## 2018-06-19 NOTE — PROGRESS NOTES
Primary Nurse Jose Yost and Denzel Pedro RN performed a dual skin assessment on this patient No impairment noted  Jovani score is 21

## 2018-06-20 LAB
ANION GAP SERPL CALC-SCNC: 8 MMOL/L (ref 5–15)
BUN SERPL-MCNC: 13 MG/DL (ref 6–20)
BUN/CREAT SERPL: 14 (ref 12–20)
CALCIUM SERPL-MCNC: 8.2 MG/DL (ref 8.5–10.1)
CHLORIDE SERPL-SCNC: 109 MMOL/L (ref 97–108)
CO2 SERPL-SCNC: 25 MMOL/L (ref 21–32)
CREAT SERPL-MCNC: 0.91 MG/DL (ref 0.7–1.3)
GLUCOSE SERPL-MCNC: 137 MG/DL (ref 65–100)
HGB BLD-MCNC: 14 G/DL (ref 12.1–17)
POTASSIUM SERPL-SCNC: 4.3 MMOL/L (ref 3.5–5.1)
SODIUM SERPL-SCNC: 142 MMOL/L (ref 136–145)

## 2018-06-20 PROCEDURE — 65270000029 HC RM PRIVATE

## 2018-06-20 PROCEDURE — 97116 GAIT TRAINING THERAPY: CPT

## 2018-06-20 PROCEDURE — 74011250637 HC RX REV CODE- 250/637: Performed by: PHYSICIAN ASSISTANT

## 2018-06-20 PROCEDURE — 80048 BASIC METABOLIC PNL TOTAL CA: CPT | Performed by: PHYSICIAN ASSISTANT

## 2018-06-20 PROCEDURE — 74011250636 HC RX REV CODE- 250/636: Performed by: PHYSICIAN ASSISTANT

## 2018-06-20 PROCEDURE — 97161 PT EVAL LOW COMPLEX 20 MIN: CPT

## 2018-06-20 PROCEDURE — 36415 COLL VENOUS BLD VENIPUNCTURE: CPT | Performed by: PHYSICIAN ASSISTANT

## 2018-06-20 PROCEDURE — G8978 MOBILITY CURRENT STATUS: HCPCS

## 2018-06-20 PROCEDURE — G8979 MOBILITY GOAL STATUS: HCPCS

## 2018-06-20 PROCEDURE — 85018 HEMOGLOBIN: CPT | Performed by: PHYSICIAN ASSISTANT

## 2018-06-20 RX ORDER — OXYCODONE HYDROCHLORIDE 5 MG/1
5-10 TABLET ORAL
Qty: 80 TAB | Refills: 0 | Status: SHIPPED | OUTPATIENT
Start: 2018-06-20 | End: 2018-12-19

## 2018-06-20 RX ORDER — ASPIRIN 325 MG
325 TABLET, DELAYED RELEASE (ENTERIC COATED) ORAL 2 TIMES DAILY
Qty: 1 TAB | Refills: 0 | Status: SHIPPED
Start: 2018-06-20 | End: 2018-06-21

## 2018-06-20 RX ADMIN — SODIUM CHLORIDE 125 ML/HR: 900 INJECTION, SOLUTION INTRAVENOUS at 04:00

## 2018-06-20 RX ADMIN — ASPIRIN 325 MG: 325 TABLET, DELAYED RELEASE ORAL at 09:19

## 2018-06-20 RX ADMIN — ACETAMINOPHEN 1000 MG: 500 TABLET, FILM COATED ORAL at 13:02

## 2018-06-20 RX ADMIN — Medication 10 ML: at 22:00

## 2018-06-20 RX ADMIN — OXYCODONE HYDROCHLORIDE 10 MG: 5 TABLET ORAL at 03:40

## 2018-06-20 RX ADMIN — CELECOXIB 200 MG: 200 CAPSULE ORAL at 18:18

## 2018-06-20 RX ADMIN — Medication 2 G: at 05:00

## 2018-06-20 RX ADMIN — Medication 10 ML: at 05:04

## 2018-06-20 RX ADMIN — SENNOSIDES AND DOCUSATE SODIUM 1 TABLET: 8.6; 5 TABLET ORAL at 09:19

## 2018-06-20 RX ADMIN — ACETAMINOPHEN 1000 MG: 500 TABLET, FILM COATED ORAL at 18:18

## 2018-06-20 RX ADMIN — CELECOXIB 200 MG: 200 CAPSULE ORAL at 09:19

## 2018-06-20 RX ADMIN — POLYETHYLENE GLYCOL 3350 17 G: 17 POWDER, FOR SOLUTION ORAL at 09:19

## 2018-06-20 RX ADMIN — OXYCODONE HYDROCHLORIDE 10 MG: 5 TABLET ORAL at 18:18

## 2018-06-20 RX ADMIN — ACETAMINOPHEN 1000 MG: 500 TABLET, FILM COATED ORAL at 06:21

## 2018-06-20 RX ADMIN — OXYCODONE HYDROCHLORIDE 10 MG: 5 TABLET ORAL at 22:42

## 2018-06-20 RX ADMIN — OXYCODONE HYDROCHLORIDE 10 MG: 5 TABLET ORAL at 06:21

## 2018-06-20 RX ADMIN — OXYCODONE HYDROCHLORIDE 10 MG: 5 TABLET ORAL at 09:19

## 2018-06-20 RX ADMIN — OXYCODONE HYDROCHLORIDE 10 MG: 5 TABLET ORAL at 00:14

## 2018-06-20 RX ADMIN — SENNOSIDES AND DOCUSATE SODIUM 1 TABLET: 8.6; 5 TABLET ORAL at 18:18

## 2018-06-20 RX ADMIN — ACETAMINOPHEN 1000 MG: 500 TABLET, FILM COATED ORAL at 23:31

## 2018-06-20 RX ADMIN — OXYCODONE HYDROCHLORIDE 10 MG: 5 TABLET ORAL at 13:02

## 2018-06-20 RX ADMIN — ASPIRIN 325 MG: 325 TABLET, DELAYED RELEASE ORAL at 18:18

## 2018-06-20 NOTE — PROGRESS NOTES
Pt. Ambulated past doorway. Activity very well tolerated. Charge nurse and primary nurse oversaw the ambulation.

## 2018-06-20 NOTE — PROGRESS NOTES
Problem: Mobility Impaired (Adult and Pediatric)  Goal: *Acute Goals and Plan of Care (Insert Text)  Physical Therapy Goals    Initiated 06/20/18    1. Patient will ambulate with modified independence for 300 feet with the least restrictive device within 4 days. 2. Patient will ascend/descend 17 stairs with forearm crutches with modified independence within 4 days. 3. Patient will perform home exercise program per protocol with modified independence within 4 days. 4. Patient will demonstrate AROM 0-90 degrees in operative joint within 4 days. physical Therapy EVALUATION  Patient: Corina Clement (45 y.o. male)  Date: 6/20/2018  Primary Diagnosis: Arthritis of knee [M17.10]  Procedure(s) (LRB):  RIGHT UNICONDYLAR KNEE (Right) 1 Day Post-Op   Precautions: WBAT       ASSESSMENT :  Based on the objective data described below, the patient presents with decreased functional independence compared to baseline with associated decreased ROM, decreased strength, impaired balance, decreased tolerance to activity, and impaired gait secondary to R TKA POD 1. Chart reviewed and RN cleared patient for mobility. Patient was agreeable to participate with PT and SPT and was received in bed. Patient lives in 2 story home with 6 steps to enter, 17 to second floor, and already owns forearm crutches. Today patient's vital signs were stable, patient transferred to EOB with Mod I, and stood with CGA x1 in RW. Patient ambulated 300 feet with forearm crutches, completed 4 stairs with supervision, brushed teeth at sink, and returned to chair at end of session with all needs placed within reach and RN notified of patient's status. PT reviewed bed exercises and acute care expectations with patient. Patient able to correctly perform ankle pumps, heel slides, and quad sets and has no additional questions. PT recommending HHPT at discharge to optimize mobility.      Patient will benefit from skilled intervention to address the above impairments. Patients rehabilitation potential is considered to be Good  Factors which may influence rehabilitation potential include:   [x]         None noted  []         Mental ability/status  []         Medical condition  []         Home/family situation and support systems  []         Safety awareness  []         Pain tolerance/management  []         Other:      PLAN :  Recommendations and Planned Interventions:  [x]           Bed Mobility Training             [x]    Neuromuscular Re-Education  [x]           Transfer Training                   []    Orthotic/Prosthetic Training  [x]           Gait Training                         []    Modalities  [x]           Therapeutic Exercises           []    Edema Management/Control  [x]           Therapeutic Activities            [x]    Patient and Family Training/Education  []           Other (comment):    Frequency/Duration: Patient will be followed by physical therapy  twice daily to address goals. Discharge Recommendations: Home Health  Further Equipment Recommendations for Discharge: none anticipated     SUBJECTIVE:   Patient stated Ariane Anguiano might need one more night but can we see after this afternoon?     OBJECTIVE DATA SUMMARY:   HISTORY:    Past Medical History:   Diagnosis Date    Arthritis     KNEE    Hemorrhoids     Nausea & vomiting     [facility] [facility]     Thromboembolus ([facility] [address] 03/2017    LT.  LEG     Past Surgical History:   Procedure Laterality Date    HX HERNIA REPAIR Right     INGUINAL    HX ORTHOPAEDIC Right     MENISCUS REPAIR 3X    HX ROTATOR CUFF REPAIR Left      Prior Level of Function/Home Situation: independent  Personal factors and/or comorbidities impacting plan of care:     Home Situation  Home Environment: Private residence  # Steps to Enter: 5  One/Two Story Residence: Two story  # of Interior Steps: 17  Interior Rails: Right  Lift Chair Available: No  Living Alone: Yes  Support Systems: Family member(s)  Patient Expects to be Discharged to[de-identified] Private residence  Current DME Used/Available at Home: None    EXAMINATION/PRESENTATION/DECISION MAKING:   Critical Behavior:  Neurologic State: Alert           Hearing: Auditory  Auditory Impairment: None  Skin:    Edema:   Range Of Motion:  AROM: Generally decreased, functional                       Strength:    Strength: Generally decreased, functional                    Tone & Sensation:                                  Coordination:  Coordination: Within functional limits  Vision:      Functional Mobility:  Bed Mobility:     Supine to Sit: Modified independent  Sit to Supine: Modified independent     Transfers:  Sit to Stand: Modified independent  Stand to Sit: Modified independent                       Balance:   Sitting: Intact  Standing: Intact; With support  Ambulation/Gait Training:  Distance (ft): 300 Feet (ft)  Assistive Device: Gait belt;Crutches (forearm crutches)  Ambulation - Level of Assistance: Contact guard assistance        Gait Abnormalities: Antalgic  Right Side Weight Bearing: As tolerated        Stance: Right decreased  Speed/Zaria: Pace decreased (<100 feet/min)  Step Length: Right shortened                     Stairs:  Number of Stairs Trained: 4  Stairs - Level of Assistance: Stand-by assistance   Rail Use:  (B forearm crutches)    Therapeutic Exercises:   Reviewed ankle pumps, heel slides, and quad sets. Frequency: 3x/day, 10 reps each.      Functional Measure:  Tinetti test:    Sitting Balance: 1  Arises: 1  Attempts to Rise: 2  Immediate Standing Balance: 1  Standing Balance: 1  Nudged: 1  Eyes Closed: 1  Turn 360 Degrees - Continuous/Discontinuous: 1  Turn 360 Degrees - Steady/Unsteady: 1  Sitting Down: 1  Balance Score: 11  Indication of Gait: 1  R Step Length/Height: 1  L Step Length/Height: 0  R Foot Clearance: 1  L Foot Clearance: 1  Step Symmetry: 0  Step Continuity: 1  Path: 1  Trunk: 0  Walking Time: 0  Gait Score: 6  Total Score: 17       Tinetti Test and G-code impairment scale:  Percentage of Impairment CH    0%   CI    1-19% CJ    20-39% CK    40-59% CL    60-79% CM    80-99% CN     100%   Tinetti  Score 0-28 28 23-27 17-22 12-16 6-11 1-5 0       Tinetti Tool Score Risk of Falls  <19 = High Fall Risk  19-24 = Moderate Fall Risk  25-28 = Low Fall Risk  Tinetti ME. Performance-Oriented Assessment of Mobility Problems in Elderly Patients. [facility]; [id number]. (Scoring Description: PT Bulletin Feb. 10, 1993)    Older adults: Sang Au et al, 2009; n = 1000 [facility] elderly evaluated with ABC, MAY, ADL, and IADL)  · Mean MAY score for males aged 69-68 years = 26.21(3.40)  · Mean MAY score for females age 69-68 years = 25.16(4.30)  · Mean MAY score for males over 80 years = 23.29(6.02)  · Mean MAY score for females over 80 years = 17.20(8.32)         G codes: In compliance with CMSs Claims Based Outcome Reporting, the following G-code set was chosen for this patient based on their primary functional limitation being treated: The outcome measure chosen to determine the severity of the functional limitation was the tinetti with a score of 17/28 which was correlated with the impairment scale.     ? Mobility - Walking and Moving Around:     - CURRENT STATUS: CJ - 20%-39% impaired, limited or restricted    - GOAL STATUS: CI - 1%-19% impaired, limited or restricted    - D/C STATUS:  ---------------To be determined---------------      Physical Therapy Evaluation Charge Determination   History Examination Presentation Decision-Making   MEDIUM  Complexity : 1-2 comorbidities / personal factors will impact the outcome/ POC  LOW Complexity : 1-2 Standardized tests and measures addressing body structure, function, activity limitation and / or participation in recreation  LOW Complexity : Stable, uncomplicated  Other outcome measures Tinetti  LOW       Based on the above components, the patient evaluation is determined to be of the following complexity level: LOW Pain:  Pain Scale 1: Numeric (0 - 10)  Pain Intensity 1: 4  Pain Location 1: Knee  Pain Orientation 1: Right  Pain Description 1: Aching; Sore  Pain Intervention(s) 1: Medication (see MAR); Ice;Rest;Distraction  Activity Tolerance:   Good  Please refer to the flowsheet for vital signs taken during this treatment. After treatment:   []         Patient left in no apparent distress sitting up in chair  [x]         Patient left in no apparent distress in bed  [x]         Call bell left within reach  [x]         Nursing notified  []         Caregiver present  []         Bed alarm activated    COMMUNICATION/EDUCATION:   The patients plan of care was discussed with: Registered Nurse. [x]         Fall prevention education was provided and the patient/caregiver indicated understanding. [x]         Patient/family have participated as able in goal setting and plan of care. [x]         Patient/family agree to work toward stated goals and plan of care. []         Patient understands intent and goals of therapy, but is neutral about his/her participation. []         Patient is unable to participate in goal setting and plan of care.     Thank you for this referral.  Radha Alva PT, DPT   Time Calculation: 31 mins

## 2018-06-20 NOTE — PROGRESS NOTES
06/19/18 1845 06/19/18 1846 06/19/18 1850   Vital Signs   Temp --  --  --    Temp Source --  --  --    Pulse (Heart Rate) 86 94 --    Heart Rate Source Monitor Monitor --    Resp Rate 16 16 --    O2 Sat (%) 98 % 95 % --    Level of Consciousness Alert Alert --    /80 131/86 (!) 62/48   MAP (Calculated) 100 101 (!) 53   BP 1 Method Automatic Automatic --    BP 1 Location Right arm Right arm --    BP Patient Position At rest;Pre-activity Sitting Standing   MEWS Score --  --  --        06/19/18 1856 06/19/18 1857 06/19/18 1859   Vital Signs   Temp --  --  97.5 °F (36.4 °C)   Temp Source --  --  Oral   Pulse (Heart Rate) --  --  78   Heart Rate Source --  --  Monitor   Resp Rate --  --  16   O2 Sat (%) --  --  99 %   Level of Consciousness --  --  Alert   BP 98/60 101/70 92/68   MAP (Calculated) 73 80 76   BP 1 Method --  --  --    BP 1 Location --  --  --    BP Patient Position --  --  --    MEWS Score --  --  2     When pt stood at the side of the bed, pt's BP dropped to 62/48, and pt was diaphoretic. I got pt back to bed, put him in trendelenburg, and gave him a 500 mL bolus. 1856-Paged Dr. Shauna Navarro  2010-Paged Dr. Shauna Hyatting  2020-Informed Dr. Shauna Navarro that pt was orthostatic. Per Dr. Shauna Navarro, ok to order 500 mL bolus.

## 2018-06-20 NOTE — PROGRESS NOTES
Care Management Interventions  PCP Verified by CM: No (Patient goes to [facility] in [address])  Palliative Care Criteria Met (RRAT>21 & CHF Dx)?: No  Mode of Transport at Discharge: Other (see comment) (family)  Transition of Care Consult (CM Consult): Home Health, Discharge Planning  [address] [address]: No  Reason Outside [place]: Physician referred to specific agency  MyChart Signup: No  Discharge Durable Medical Equipment: No  Health Maintenance Reviewed: Yes  Physical Therapy Consult: Yes  Occupational Therapy Consult: No  Speech Therapy Consult: No  Current Support Network: Own Home, Family Lives Nearby, Lives Alone (patient's parents will be staying with him post discharge)  Confirm Follow Up Transport: Family  Plan discussed with Pt/Family/Caregiver: Yes  Freedom of Choice Offered: Yes  Discharge Location  Discharge Placement: Home with home health     Reason for Admission:   RIGHT UNICONDYLAR KNEE                    RRAT Score:  3                    Plan for utilizing home health:     Offered freedom of choice, patient prefers [facility]. Referral sent via [employer]. They have accepted patient. Likelihood of Readmission: no                          Transition of Care Plan:home with home health.     Calvin Gregory, BSW/CRM

## 2018-06-20 NOTE — ANESTHESIA POSTPROCEDURE EVALUATION
Post-Anesthesia Evaluation and Assessment    Patient: Sherryle Boatman MRN: [id number]  SSN: [id number]    YOB: 1965  Age: 46 y.o. Sex: male       Cardiovascular Function/Vital Signs  Visit Vitals    /76    Pulse 78    Temp 36.7 °C (98 °F)    Resp 16    Wt 101.4 kg (223 lb 9.6 oz)    SpO2 94%    BMI 29.5 kg/m2       Patient is status post spinal anesthesia for Procedure(s):  RIGHT UNICONDYLAR KNEE. Nausea/Vomiting: None    Postoperative hydration reviewed and adequate. Pain:  Pain Scale 1: Numeric (0 - 10) (06/20/18 0622)  Pain Intensity 1: 7 (06/20/18 0622)   Managed    Neurological Status:   Neuro (WDL): Exceptions to WDL (06/19/18 1521)  Neuro  Neurologic State: Alert (06/19/18 2000)  LUE Motor Response: Purposeful (06/19/18 2000)  LLE Motor Response: Purposeful (06/19/18 2000)  RUE Motor Response: Purposeful (06/19/18 2000)  RLE Motor Response: Purposeful;Weak (06/19/18 2000)   At baseline    Mental Status and Level of Consciousness: Arousable    Pulmonary Status:   O2 Device: Room air (06/19/18 1933)   Adequate oxygenation and airway patent    Complications related to anesthesia: None    Post-anesthesia assessment completed.  No concerns    Signed By: Dhaval Pike MD     June 20, 2018

## 2018-06-20 NOTE — PROGRESS NOTES
Problem: Mobility Impaired (Adult and Pediatric)  Goal: *Acute Goals and Plan of Care (Insert Text)  Physical Therapy Goals    Initiated 06/20/18    1. Patient will ambulate with modified independence for 300 feet with the least restrictive device within 4 days. 2. Patient will ascend/descend 17 stairs with forearm crutches with modified independence within 4 days. 3. Patient will perform home exercise program per protocol with modified independence within 4 days. 4. Patient will demonstrate AROM 0-90 degrees in operative joint within 4 days. physical Therapy TREATMENT  Patient: Minda Stahl (78 y.o. male)  Date: 6/20/2018  Diagnosis: Arthritis of knee [M17.10] Osteoarthritis of right knee  Procedure(s) (LRB):  RIGHT UNICONDYLAR KNEE (Right) 1 Day Post-Op  Precautions:    Chart, physical therapy assessment, plan of care and goals were reviewed. ASSESSMENT:  Chart reviewed, RN cleared patient for mobility, and patient received in chair. PT reviewed LE ther ex as RN was getting pain meds for patient to take. Following patient ambulated 300ft and completed 14 stairs with CGA. Overall patient is Mod I however with fagitue/pain patient began to demos poor/fair crutch mechanics resulting in 4 moderate LOB while descending. PT educated patient on crutch mechanics (securing on next step before moving either LE) and utilizing rail for additional stability, patient received well. Patient ended session in bed with all needs placed within reach and RN notified of patient's status. Discharge rec - HHPT tomorrow following clearance on 17 stairs with no LOB. Progression toward goals:  [x]    Improving appropriately and progressing toward goals  []    Improving slowly and progressing toward goals  []    Not making progress toward goals and plan of care will be adjusted     PLAN:  Patient continues to benefit from skilled intervention to address the above impairments.   Continue treatment per established plan of care.  Discharge Recommendations:  Home Health  Further Equipment Recommendations for Discharge:  None anticipated     SUBJECTIVE:   Patient stated Modesta Pittman I think I definitely need another night. Its only my second time using these crutches and going down the stairs is more scary than I thought.     OBJECTIVE DATA SUMMARY:   Critical Behavior:  Neurologic State: Alert           Functional Mobility Training:  Bed Mobility:     Supine to Sit: Modified independent  Sit to Supine: Modified independent           Transfers:  Sit to Stand: Modified independent  Stand to Sit: Modified independent                             Balance:  Sitting: Intact  Standing: Intact; With support  Ambulation/Gait Training:  Distance (ft): 300 Feet (ft)  Assistive Device: Gait belt;Crutches  Ambulation - Level of Assistance: Modified independent        Gait Abnormalities: Antalgic  Right Side Weight Bearing: As tolerated        Stance: Right decreased  Speed/Zaria: Pace decreased (<100 feet/min)  Step Length: Right shortened;Left shortened                    Stairs:  Number of Stairs Trained: 14  Stairs - Level of Assistance: Contact guard assistance (Mod I for the majority, 4 instances of LOB due to fatigue)   Rail Use: Left  (B crutches vs R crutch and L rail)    Neuro Re-Education:    Therapeutic Exercises:   Reviewed ankle pumps, heel slides, and quad sets. Frequency: 3x/day, 10 reps each. Pain:  Pain Scale 1: Numeric (0 - 10)  Pain Intensity 1: 5  Pain Location 1: Knee  Pain Orientation 1: Anterior;Posterior; Upper;Right  Pain Description 1: Aching  Pain Intervention(s) 1: Medication (see MAR); Ice  Activity Tolerance:   Good, LOB on stairs x 4, improved with education and rest   Please refer to the flowsheet for vital signs taken during this treatment.   After treatment:   []    Patient left in no apparent distress sitting up in chair  [x]    Patient left in no apparent distress in bed  [x]    Call bell left within reach  [x] Nursing notified  []    Caregiver present  []    Bed alarm activated    COMMUNICATION/COLLABORATION:   The patients plan of care was discussed with: Registered Nurse    Mya Bradley PT, DPT   Time Calculation: 24 mins

## 2018-06-20 NOTE — PROGRESS NOTES
Spiritual Care Assessment/Progress Note  [address]      NAME: Minda Stahl      MRN: [id number]  AGE: 46 y.o.  SEX: male  Hindu Affiliation: Other   Language: English     6/20/2018     Total Time (in minutes): 12     Spiritual Assessment begun in [id number] [address] through conversation with:         [x]Patient        [] Family    [] Friend(s)        Reason for Consult: Advance medical directive consult     Spiritual beliefs: (Please include comment if needed)     [] Identifies with a kyler tradition:         [] Supported by a kyler community:            [] Claims no spiritual orientation:           [] Seeking spiritual identity:                [] Adheres to an individual form of spirituality:           [x] Not able to assess:                           Identified resources for coping:      [] Prayer                               [] Music                  [] Guided Imagery     [] Family/friends                 [] Pet visits     [] Devotional reading                         [x] Unknown     [] Other:                                               Interventions offered during this visit: (See comments for more details)    Patient Interventions: Advance medical directive consult, Affirmation of emotions/emotional suffering, Affirmation of kyler, Initial/Spiritual assessment, patient floor, Normalization of emotional/spiritual concerns           Plan of Care:     [] Support spiritual and/or cultural needs    [x] Support AMD and/or advance care planning process      [] Support grieving process   [] Coordinate Rites and/or Rituals    [] Coordination with community clergy   [] No spiritual needs identified at this time   [] Detailed Plan of Care below (See Comments)  [] Make referral to Music Therapy  [] Make referral to Pet Therapy     [] Make referral to Addiction services  [] Make referral to [facility]  [] Make referral to Spiritual Care Partner  [] No future visits requested        [x] Follow up visits as needed     Comments:  visit for Advance Medical Directive (AMD) consult. Pt was interested in looking over paperwork.  left documents and booklet with pt. Let patient know if/when he is ready to complete paperwork, have [phone]ie contacted.  follow up as needed.      Julio César Ram, [facility]   [phone])

## 2018-06-20 NOTE — PROGRESS NOTES
Complaints: none  Events: none      GEN:  NAD. AOx3   ABD:  S/NT/ND   RLE:  Dressing C/D/I    5/5 motor    Calf nttp (Bilat)    Sensate all distribution to light touch    1+ dp/pt pulses, foot perfused      Lab Results   Component Value Date/Time    HGB 14.0 06/20/2018 03:36 AM    INR 1.0 06/04/2018 12:43 PM       Lab Results   Component Value Date/Time    Sodium 142 06/20/2018 03:36 AM    Potassium 4.3 06/20/2018 03:36 AM    Chloride 109 (H) 06/20/2018 03:36 AM    CO2 25 06/20/2018 03:36 AM    BUN 13 06/20/2018 03:36 AM    Creatinine 0.91 06/20/2018 03:36 AM    Calcium 8.2 (L) 06/20/2018 03:36 AM            POD #1 RIGHT PARTIAL KNEE REPLACEMENT. Satisfactory progress. Patient is doing well. Pain is well-controlled on current medications. He would like to see how PT goes this morning before making any decisions about discharge. ABX: Complete today  PATHWAY: D/C [facility] per protocol  DVT Prophylaxis: ASA  Weight Bearing: WBAT RLE   Pain Control: PRN oral narcotics, celebrex  Anticipated Discharge Date: today-tomorrow  Disposition: Home, PT.

## 2018-06-20 NOTE — PROGRESS NOTES
Problem: Discharge Planning  Goal: *Discharge to safe environment  Outcome: Progressing Towards Goal  Discharge disposition: home with home health.     ANAID Peres/CRM

## 2018-06-20 NOTE — ACP (ADVANCE CARE PLANNING)
visit for Advance Medical Directive (AMD) consult. Pt was interested in looking over paperwork.  left documents and booklet with pt. Let patient know if/when he is ready to complete paperwork, have [phone] [address] contacted.  follow up as needed.      Julio César Ram, [facility] [place]   [phone])

## 2018-06-21 VITALS
RESPIRATION RATE: 16 BRPM | DIASTOLIC BLOOD PRESSURE: 93 MMHG | BODY MASS INDEX: 29.5 KG/M2 | TEMPERATURE: 97.6 F | HEART RATE: 88 BPM | WEIGHT: 223.6 LBS | OXYGEN SATURATION: 99 % | SYSTOLIC BLOOD PRESSURE: 153 MMHG

## 2018-06-21 LAB — HGB BLD-MCNC: 14 G/DL (ref 12.1–17)

## 2018-06-21 PROCEDURE — 97116 GAIT TRAINING THERAPY: CPT

## 2018-06-21 PROCEDURE — 85018 HEMOGLOBIN: CPT | Performed by: PHYSICIAN ASSISTANT

## 2018-06-21 PROCEDURE — 36415 COLL VENOUS BLD VENIPUNCTURE: CPT | Performed by: PHYSICIAN ASSISTANT

## 2018-06-21 PROCEDURE — 74011250637 HC RX REV CODE- 250/637: Performed by: PHYSICIAN ASSISTANT

## 2018-06-21 RX ADMIN — ACETAMINOPHEN 1000 MG: 500 TABLET, FILM COATED ORAL at 05:28

## 2018-06-21 RX ADMIN — SENNOSIDES AND DOCUSATE SODIUM 1 TABLET: 8.6; 5 TABLET ORAL at 09:14

## 2018-06-21 RX ADMIN — OXYCODONE HYDROCHLORIDE 10 MG: 5 TABLET ORAL at 03:25

## 2018-06-21 RX ADMIN — ACETAMINOPHEN 1000 MG: 500 TABLET, FILM COATED ORAL at 13:21

## 2018-06-21 RX ADMIN — ASPIRIN 325 MG: 325 TABLET, DELAYED RELEASE ORAL at 09:14

## 2018-06-21 RX ADMIN — OXYCODONE HYDROCHLORIDE 10 MG: 5 TABLET ORAL at 08:21

## 2018-06-21 RX ADMIN — CELECOXIB 200 MG: 200 CAPSULE ORAL at 09:14

## 2018-06-21 RX ADMIN — OXYCODONE HYDROCHLORIDE 10 MG: 5 TABLET ORAL at 13:21

## 2018-06-21 RX ADMIN — Medication 10 ML: at 06:00

## 2018-06-21 NOTE — DISCHARGE SUMMARY
[address] [place], [place] [address] [facility] [address]    DISCHARGE SUMMARY     Patient: Ignacio Benites                             Medical Record Number: [id number]                : 1965  Age: 46 y.o. Admit Date: 2018  Discharge Date:   Admission Diagnosis: Arthritis of knee [M17.10]  Discharge Diagnosis: Arthritis of knee [M17.10]  Procedures: Procedure(s):  RIGHT UNICONDYLAR KNEE  Surgeon: Merary Carranza  Assistants: Camilla  Anesthesia: spinal  Complications: None     History of Present Illness:  Ignacio Benites is a 46 y.o. male with a history of Right knee pain, swelling, and marked loss of function. Despite conservative management and after clinical and radiographic evaluation, it was determined that he suffered from end-stage osteoarthritis and would benefit from Procedure(s):  RIGHT UNICONDYLAR KNEE, which he consented to undergo after a discussion of the risks, benefits, alternatives, rehab concerns, and potential complications of surgery. Hospital Course:  Jonathan Soni tolerated the procedure well. He was transferred  to the recovery room in stable condition. After a brief stay the patient was then transferred to the Joint Replacement Unit at [address] [place] [address].  On postoperative day #1, the dressing was clean and dry, he was neurovascularly intact. The patient was afebrile and vital signs were stable. Calves were soft and non-tender bilaterally. On postoperative day  # 2, the patient was tolerating a regular diet and making satisfactory progress with physical therapy. Hemoglobin and INR prior to discharge were   Lab Results   Component Value Date/Time    HGB 14.0 2018 03:29 AM    INR 1.0 2018 12:43 PM   .  Ignacio Benites made satisfactory progress with physical therapy and was discharged to Home in stable condition on postoperative day 2.   He was provided with routine postoperative instructions and advised to follow up in my office in 3 weeks following discharge from the hospital.  He was prescribed Xarelto for DVT prophylaxis and oxycodone for post-operative pain. Discharge Medications:  Current Discharge Medication List      START taking these medications    Details   rivaroxaban (XARELTO) 10 mg tablet Take 1 Tab by mouth daily (with breakfast). Indications: KNEE REPLACEMENT DEEP VEIN THROMBOSIS PREVENTION  Qty: 30 Tab, Refills: 1      oxyCODONE IR (ROXICODONE) 5 mg immediate release tablet Take 1-2 Tabs by mouth every four (4) hours as needed. Max Daily Amount: 60 mg.  Qty: 80 Tab, Refills: 0    Associated Diagnoses: Primary osteoarthritis of right knee         CONTINUE these medications which have NOT CHANGED    Details   ginkgo biloba leaf extract 120 mg cap Take 1 Cap by mouth daily.          STOP taking these medications       aspirin delayed-release 81 mg tablet Comments:   Reason for Stopping:         ibuprofen (ADVIL) 200 mg tablet Comments:   Reason for Stopping:               Signed by: MARLEY Lanier  6/21/2018

## 2018-06-21 NOTE — PROGRESS NOTES
I have reviewed discharge instructions with the patient and spouse. The patient and spouse verbalized understanding. The patient and his spouse watched the discharge video. The patient was taken to patient discharge in a wheelchair by volunteer.

## 2018-06-21 NOTE — PROGRESS NOTES
Complaints: none  Events: none    GEN:  NAD. AOx3   ABD:  S/NT/ND   RLE:  Dressing C/D/I    5/5 motor    Calf nttp (Bilat)    Sensate all distribution to light touch    1+ dp/pt pulses, foot perfused      Lab Results   Component Value Date/Time    HGB 14.0 06/21/2018 03:29 AM    INR 1.0 06/04/2018 12:43 PM       Lab Results   Component Value Date/Time    Sodium 142 06/20/2018 03:36 AM    Potassium 4.3 06/20/2018 03:36 AM    Chloride 109 (H) 06/20/2018 03:36 AM    CO2 25 06/20/2018 03:36 AM    BUN 13 06/20/2018 03:36 AM    Creatinine 0.91 06/20/2018 03:36 AM    Calcium 8.2 (L) 06/20/2018 03:36 AM            POD#2 RIGHT PARTIAL KNEE REPLACEMENT. Doing Well. Patient has made good progress. He is concerned about the 17 stairs he has at home but has cleared PT. Pain is well-controlled. Patient is ready for discharge. Follow up in office in 3 weeks. ABX: Complete today  DVT Prophylaxis: Xarelto, patient has had a previous DVT that I was unaware of until today. Was on ASA in the hospital but will be discharged with Xarelto. Weight Bearing: WBAT RLE   Pain Control: PRN oral narcotics, celebrex  Anticipated Discharge Date: today  Disposition: Home, HHPT.

## 2018-06-21 NOTE — DISCHARGE INSTRUCTIONS
Discharge Instructions Unicondylar Knee Replacement  Dr. Cait Farias    Patient Name: Yasmine Packer  Date of procedure: 6/19/2018   Procedure: Procedure(s):  RIGHT UNICONDYLAR KNEE  Surgeon: Tamia Mccollum) and Role:     * Dru Skinner MD - Primary  PCP: None  Date of discharge: No discharge date for patient encounter. Follow up appointments   Follow up with Dr. Cait Farias in 3 weeks. Call [phone] to make an appointment.  If home health has been arranged for you the agency will contact you to arrange dates/times for visits. Please call them if you do not hear from them within 24 hours after you are discharged    When to call your Orthopaedic Surgeon: Call [phone]. If you call after 5pm or on a weekend, the on call physician will be contacted   Unrelieved pain   Signs of infection-if your incision is red; continues to have drainage; drainage has a foul odor or if you have a persistent fever over 101 degrees   Signs of a blood clot in your leg-calf pain, tenderness, redness, swelling of lower leg    When to call your Primary Care Physician:   Concerns about medical conditions such as diabetes, high blood pressure, asthma, congestive heart failure   Call if blood sugars are elevated, persistent headache or dizziness, coughing or congestion, constipation or diarrhea, burning with urination, abnormal heart rate    When to call [phone] go to the nearest emergency room   Acute onset of chest pain, shortness of breath, difficulty breathing      Activity   Weight bearing as tolerated with walker or crutches.  Refer to pages 23-31 of your handbook for instructions and pictures   Complete your Home Exercise Program daily as instructed by your therapist.  Refer to pages 33-41 of your handbook for instructions and pictures   Get up every one hour and walk (except at night when sleeping)   Do not drive or operate heavy machinery    Incision Care   The Aquacel (brown, waterproof) surgical dressing is to remain on your knee for 7 days. On the 7th day have someone gently peel the dressing off by carefully lifting the edge and stretching it slightly to break the adhesive seal and leave incision open to air. You may take a shower with the Aquacel dressing in place.  You do have staples in your knee incision; if present they will be removed by the [address] [place] [address] [facility] staff in 10-14 days and steri-strips will be applied. Leave the steri-strips on until they fall off    Preventing blood clots    Take Aspirin as prescribed. Pain management   Take pain medication as prescribed; decrease the amount you use as your pain lessens   Avoid alcoholic beverages while taking pain medication   Do not take any over-the-counter medication for pain except Tylenol (acetaminophen)   Please be aware that many medications contain Tylenol. We do not want you to over medicate so please read the information below as a guide. Do not take more than 3 Grams of Tylenol in a 24 hour period. (There are 1000 milligrams in one Gram)  o 325 mg of Tylenol per tablet (do not take more than 9 tablets in 24 hours)  o 500 mg of Tylenol per tablet (do not take more than 6 tablets in 24 hours)   You may place an ice bag on your knee for 15-20 minutes after exercising and as needed throughout the day and night    Diet   Resume usual diet; drink plenty of fluids; eat foods high in fiber   You may want to take a stool softener (such as Senokot-S or Colace) to prevent constipation while you are taking pain medication.   If constipation occurs, take a laxative (such as Dulcolax tablets, Milk of Magnesia, or a suppository)

## 2018-06-21 NOTE — PROGRESS NOTES
Problem: Mobility Impaired (Adult and Pediatric)  Goal: *Acute Goals and Plan of Care (Insert Text)  Physical Therapy Goals    Initiated 06/20/18    1. Patient will ambulate with modified independence for 300 feet with the least restrictive device within 4 days. 2. Patient will ascend/descend 17 stairs with forearm crutches with modified independence within 4 days. 3. Patient will perform home exercise program per protocol with modified independence within 4 days. 4. Patient will demonstrate AROM 0-90 degrees in operative joint within 4 days. physical Therapy TREATMENT  Patient: Kenneth Cabral (3 y.o. male)  Date: 6/21/2018  Diagnosis: Arthritis of knee [M17.10] Osteoarthritis of right knee  Procedure(s) (LRB):  RIGHT UNICONDYLAR KNEE (Right) 2 Days Post-Op  Precautions:    Chart, physical therapy assessment, plan of care and goals were reviewed. ASSESSMENT:  Pt seen  This AM for additional stair training and gait. Pt completed (cleared) 20 steps w/ CGA and no LOB using Lofstrand crutches. Pt also amb x350 FT (Supevision). Noted pt w/ stiffened RLE during gait prior to steps, and post stair training noted increase in knee flexion and emerging heel-toe gait (no LOB or knee buckling noted). Pt returned to chair at bedside, completed LAQ and Heel slides. Noted pt AROM (flexion) at 50 degrees, attempted PROM (flexion) however only able to achieve 52 degrees as pt somewhat resisting and w/ c/o pain. Encouraged pt to continue exercises/ROM that increase flexion. Reviewed HEP, icing, activity pacing, hourly position changes w/ pt. Pt w/o additional questions for PT at this time. Pt cleared from PT standpoint for d/c home w/ HHPT. RN aware.    Progression toward goals:  [x]      Improving appropriately and progressing toward goals  []      Improving slowly and progressing toward goals  []      Not making progress toward goals and plan of care will be adjusted     PLAN:  Patient continues to benefit from skilled intervention to address the above impairments. Continue treatment per established plan of care. Discharge Recommendations:  Home Health  Further Equipment Recommendations for Discharge:  Has Lofstrand Crutches       SUBJECTIVE:   Patient stated Now I've got some work to do.     OBJECTIVE DATA SUMMARY:   Critical Behavior:  Neurologic State: Alert           Range of Motion:   AROM (Flexion): 50      PROM (Flexion): 52 (w/pain)                       Functional Mobility Training:  Bed Mobility:     Supine to Sit: Modified independent  Sit to Supine:  (NT- pt returned to chair; instructed gait blet for further R)           Transfers:  Sit to Stand: Modified independent  Stand to Sit: Modified independent                             Balance:  Sitting: Intact  Standing: Intact; With support  Ambulation/Gait Training:  Distance (ft): 350 Feet (ft)  Assistive Device: Crutches;Gait belt (Lofstrand)  Ambulation - Level of Assistance: Supervision        Gait Abnormalities: Antalgic;Decreased step clearance (decreaed knee flexion (R))  Right Side Weight Bearing: As tolerated  Left Side Weight Bearing: Full  Base of Support: Narrowed  Stance: Right decreased  Speed/Zaria: Pace decreased (<100 feet/min)  Step Length: Left shortened;Right shortened  Swing Pattern: Right asymmetrical                 Stairs:  Number of Stairs Trained: 20  Stairs - Level of Assistance: Contact guard assistance  Rail Use: None (used crutches)  Therapeutic Exercises:     EXERCISE   Sets   Reps   Active Active Assist   Passive Self ROM   Comments   Ankle Pumps   []                                        []                                        []                                        []                                           Quad Sets   []                                        []                                        []                                        []                                           Hamstring Sets   [] []                                        []                                        []                                           Short Arc Quads   []                                        []                                        []                                        []                                           Knee Extension Stretch     []                                          []                                          []                                          []                                           Heel Slides  10 [x]                                        []                                        []                                        []                                           Long Arc Quads  10 []                                        []                                        [x]                                        []                                           Knee Flexion Stretch   []                                        []                                        []                                        []                                           Straight Leg Raises   []                                        []                                        []                                        []                                             Pain:  Pain Scale 1: Numeric (0 - 10)  Pain Intensity 1: 4           Pain Intervention(s) 1: Medication (see MAR)  Activity Tolerance:   VSS. Please refer to the flowsheet for vital signs taken during this treatment.   After treatment:   [x] Patient left in no apparent distress sitting up in chair  [] Patient left in no apparent distress in bed  [x] Call bell left within reach  [x] Nursing notified  [] Caregiver present  [] Bed alarm activated    COMMUNICATION/COLLABORATION:   The patients plan of care was discussed with: Registered Nurse    Anthony Lobo PTA   Time Calculation: 21 mins

## 2018-06-21 NOTE — PROGRESS NOTES
Physical Therapy  6/21/2018    Chart reviewed, RN cleared pt for PT. Pt completed gait and stair training this morning using Lofstrand crutches. Pt progressing well towards goals, completed ~350 FT of gait training and cleared 20 steps. Pt ready for d/c home w/ HHPT as he feels much more confident in his ability to complete stairs once returned home. RN aware. Full note to follow.      Lisa Means, PTA

## 2018-12-12 ENCOUNTER — TELEPHONE (OUTPATIENT)
Dept: ONCOLOGY | Age: 53
End: 2018-12-12

## 2018-12-12 ENCOUNTER — HOSPITAL ENCOUNTER (OUTPATIENT)
Dept: VASCULAR SURGERY | Age: 53
Discharge: HOME | End: 2018-12-12
Attending: REGISTERED NURSE
Payer: COMMERCIAL

## 2018-12-12 DIAGNOSIS — I82.402 ACUTE DEEP VEIN THROMBOSIS (DVT) OF LEFT LOWER EXTREMITY, UNSPECIFIED VEIN (HCC): Primary | ICD-10-CM

## 2018-12-12 DIAGNOSIS — I82.409 ACUTE DEEP VEIN THROMBOSIS (DVT) OF LOWER EXTREMITY, UNSPECIFIED LATERALITY, UNSPECIFIED VEIN (HCC): Primary | ICD-10-CM

## 2018-12-12 DIAGNOSIS — I82.402 ACUTE DEEP VEIN THROMBOSIS (DVT) OF LEFT LOWER EXTREMITY, UNSPECIFIED VEIN (HCC): ICD-10-CM

## 2018-12-12 PROCEDURE — 93970 EXTREMITY STUDY: CPT

## 2018-12-12 NOTE — TELEPHONE ENCOUNTER
[facility] with vascular. HIPAA verified. Positive bilat popliteal  L chronic. R acute. Advised patient HIPAA verified. Per provider. Sameera to be called to [facility]. He has been on xarelto in the past.  He is not currently taking ASA. Advised patient to call us if he has any trouble picking up medication. He is to call for follow up appointment with us.

## 2018-12-12 NOTE — TELEPHONE ENCOUNTER
Pt called and stated he was last seen by  last year for DVT and think he maybe has another one in his calf. He would like to know if he needs to come in to see  or does he have to follow up with his PCP.     [phone]

## 2018-12-12 NOTE — TELEPHONE ENCOUNTER
Call returned to patient, HIPAA verified. Patient has some discomfort to right lower extremity. Has been battling plantar fascitis and does not know if this is related or if given his history he has another blood clot. No redness or swelling noted at this time. Has not been taking aspirin daily as recommended and has been off of anticoagulation since last follow up last year. Patient was unsure of how to proceed and does not currently have PCP. Advised would speak with provider and return call. Thanked for assistance.

## 2018-12-12 NOTE — PROCEDURES
[facility] [employer]  *** FINAL REPORT ***    Name: Cassi Valencia  MRN: [id number]    Outpatient  : [address] 1965  HIS Order #: [id number]  [id number] [facility] Visit #: [id number]  Date: 12 Dec 2018    TYPE OF TEST: Peripheral Venous Testing    REASON FOR TEST  Limb swelling    Right Leg:-  Deep venous thrombosis:           Yes  Proximal extent of thrombus:      Popliteal At The Knee  Superficial venous thrombosis:    No  Deep venous insufficiency:        Not examined  Superficial venous insufficiency: Not examined    Left Leg:-  Deep venous thrombosis:           Yes  Proximal extent of thrombus:      Popliteal Above The Knee  Superficial venous thrombosis:    No  Deep venous insufficiency:        Not examined  Superficial venous insufficiency: Not examined      INTERPRETATION/FINDINGS  PROCEDURE:  Color duplex ultrasound imaging of lower extremity veins. FINDINGS:       Right:  Consistent with thrombosis involving the popliteal as  demonstrated by vein non-compressibility, and by a narrowing or  occlusion of the flow channel on color Doppler imaging. The common  femoral, deep femoral, femoral, posterior tibial, peroneal, and great  saphenous are patent and without evidence of thrombus;  each is fully  compressible and there is no narrowing of the flow channel on color  Doppler imaging. Phasic flow is observed in the common femoral vein. There is an incidental finding of a prominent groin lymph node  measuring 4.2cm x 0.7cm. Left:  Consistent with thrombosis involving the popliteal as  demonstrated by vein non-compressibility, and by a narrowing or  occlusion of the flow channel on color Doppler imaging. The common  femoral, deep femoral, femoral, posterior tibial, and great saphenous  are patent and without evidence of thrombus;  each is fully  compressible and there is no narrowing of the flow channel on color  Doppler imaging. Phasic flow is observed in the common femoral vein.    Limited visualization of the peroneal veins. There is an incidental  finding of a prominent groin lymph node measuring 2.3cm x 0.6cm. IMPRESSION:  There is evidence of vein thrombosis bilaterally, as  described above. The ultrasound appearance is more consistent with an   acute than a chronic process on the right. The ultrasound appearance   is more consistent with a chronic than an acute process on the left. There is an incidental finding of a prominent groin lymph node  bilaterally as described above. ADDITIONAL COMMENTS    Verbal report of preliminary findings to Oseas Law at [facility] by Mohan FINNEGAN on date 12/12/18 at time 15:20hrs. I have personally reviewed the data relevant to the interpretation of  this  study.     TECHNOLOGIST: Sivakumar Roman  Signed: 12/12/2018 03:41 PM    PHYSICIAN: Toribio Conner MD  Signed: 12/13/2018 07:49 AM

## 2018-12-12 NOTE — TELEPHONE ENCOUNTER
Call to [facility]. They did receive the prescription and they do have it available for patient. HIPAA verified. l

## 2018-12-12 NOTE — TELEPHONE ENCOUNTER
Advised doppler scheduled today at [address] [place] [address] for 2:00 arrival.  Verbalized understanding. HIPAA verified. Advised we would call with results.

## 2018-12-19 ENCOUNTER — OFFICE VISIT (OUTPATIENT)
Dept: ONCOLOGY | Age: 53
End: 2018-12-19

## 2018-12-19 VITALS
HEART RATE: 65 BPM | TEMPERATURE: 97.9 F | BODY MASS INDEX: 30.38 KG/M2 | HEIGHT: 73 IN | RESPIRATION RATE: 20 BRPM | DIASTOLIC BLOOD PRESSURE: 88 MMHG | OXYGEN SATURATION: 96 % | WEIGHT: 229.2 LBS | SYSTOLIC BLOOD PRESSURE: 137 MMHG

## 2018-12-19 DIAGNOSIS — Z12.9 CANCER SCREENING: ICD-10-CM

## 2018-12-19 DIAGNOSIS — I82.403 ACUTE DEEP VEIN THROMBOSIS (DVT) OF BOTH LOWER EXTREMITIES, UNSPECIFIED VEIN (HCC): Primary | ICD-10-CM

## 2018-12-19 NOTE — PROGRESS NOTES
Andie Araujo is a 46 y.o. male here today for follow up, DVT. 1. Have you been to the ER, urgent care clinic since your last visit? Hospitalized since your last visit? Knee replacement in June [facility]    2. Have you seen or consulted any other health care providers outside of the [address] [place] [address] since your last visit? Include any pap smears or colon screening.  No

## 2018-12-19 NOTE — PROGRESS NOTES
Hematology follow up    REASON FOR VISIT: DVT and PE  REQUESTED BY: Dr. Zaynab Brumfield: Mr. Andres Olivares is a 46 y.o. male with no significant past medical history presented to the ER with left leg swelling on 3/30/17. Lower extremity Dopplers on 3/28/17 consistent with thrombosis involving the distal femoral, popliteal, soleal, posterior tibial, peroneal and soleal veins on the left. CT chest on 3/28/17 demonstrated bilateral pulmonary emboli. CBC was notable for a slightly decreased platelet count of 374X on 3/29/17, CMP was notable for a normal creatinine of 1.1. Patient was started on apixaban 10 mg 2 times daily on 3/30/17. He drove to [place] and back about a month prior to the DVT. He developed some left leg swelling right after his trip, this got progressively worse with pain . He then developed VILLA and dizziness, prompting his visit to the ER. Due to the provoked nature of the clot, 3 months of Xarelto were recommended. He had persistent symptoms and hence extended to additional 3 months on Xarelto. After addition 3 months venous duplex showed clot resolution and Xarelto was stopped. He called our office on 12/12 with right calf pain. Venous duplex was done and showed an acute right DVT and chronic left DVT. States he has right calf pain but denies swelling or color changes. No pain in his left leg. Feels like he has pulled a muscle in his right calf. He had a knee replacement in June and was on Xarelto for 21 days after surgery. He has not been on ASA since late summer. He states he has become more active recently and been exercising on a regular basis. He denies recent travel or long periods of inactivity. He denies abnormal bleeding. Denies SOB, CP, or cough. He rarely uses alcohol, has never smoked. Father had bladder cancer in his 76s, had a cousin with liver cancer.        Past Surgical History:   Procedure Laterality Date    HX HERNIA REPAIR Right     INGUINAL    HX ORTHOPAEDIC Right     MENISCUS REPAIR 3X    HX ROTATOR CUFF REPAIR Left        No Known Allergies    Current Outpatient Medications   Medication Sig Dispense Refill    OTHER CBD oil      rivaroxaban (XARELTO) 15 mg tab tablet Take 1 Tab by mouth two (2) times a day for 21 days. 42 Tab 0    [START ON 1/3/2019] rivaroxaban (XARELTO) 20 mg tab tablet Take 1 Tab by mouth daily. Starting 1/3/19 30 Tab 3    oxyCODONE IR (ROXICODONE) 5 mg immediate release tablet Take 1-2 Tabs by mouth every four (4) hours as needed. Max Daily Amount: 60 mg. 80 Tab 0    ginkgo biloba leaf extract 120 mg cap Take 1 Cap by mouth daily. Social History     Socioeconomic History    Marital status: SINGLE     Spouse name: Not on file    Number of children: Not on file    Years of education: Not on file    Highest education level: Not on file   Tobacco Use    Smoking status: Never Smoker    Smokeless tobacco: Never Used   Substance and Sexual Activity    Alcohol use:  Yes     Alcohol/week: 2.4 oz     Types: 4 Cans of beer per week    Drug use: No       Family History   Problem Relation Age of Onset    No Known Problems Mother     Cancer Father         BLADDER    No Known Problems Brother     No Known Problems Brother     Anesth Problems Neg Hx        Physical Examination:   Visit Vitals  /88 (BP 1 Location: Left arm, BP Patient Position: Sitting)   Pulse 65   Temp 97.9 °F (36.6 °C) (Oral)   Resp 20   Ht 6' 1\" (1.854 m)   Wt 229 lb 3.2 oz (104 kg)   SpO2 96%   BMI 30.24 kg/m²     General appearance - alert, well appearing, and in no distress  Mental status - oriented to person, place, and time  Mouth - mucous membranes moist, pharynx normal without lesions  Lymphatics - no palpable lymphadenopathy, no hepatosplenomegaly  Chest - clear to auscultation, no wheezes, rales or rhonchi, symmetric air entry  Abdomen - soft, nontender, nondistended, no masses or organomegaly, bowel sounds present  Ext: No swelling or discoloration    LABS  Lab Results   Component Value Date/Time    WBC 5.8 06/04/2018 12:43 PM    HGB 14.0 06/21/2018 03:29 AM    HCT 45.9 06/04/2018 12:43 PM    PLATELET 255 44/47/9538 12:43 PM    MCV 96.0 06/04/2018 12:43 PM    ABS. NEUTROPHILS 5.7 03/28/2017 02:27 PM     Lab Results   Component Value Date/Time    Sodium 142 06/20/2018 03:36 AM    Potassium 4.3 06/20/2018 03:36 AM    Chloride 109 (H) 06/20/2018 03:36 AM    CO2 25 06/20/2018 03:36 AM    Glucose 137 (H) 06/20/2018 03:36 AM    BUN 13 06/20/2018 03:36 AM    Creatinine 0.91 06/20/2018 03:36 AM    GFR est AA >60 06/20/2018 03:36 AM    GFR est non-AA >60 06/20/2018 03:36 AM    Calcium 8.2 (L) 06/20/2018 03:36 AM     Lab Results   Component Value Date/Time    AST (SGOT) 19 03/28/2017 02:27 PM    Alk. phosphatase 81 03/28/2017 02:27 PM    Protein, total 7.8 03/28/2017 02:27 PM    Albumin 4.4 03/28/2017 02:27 PM    Globulin 3.4 03/28/2017 02:27 PM    A-G Ratio 1.3 03/28/2017 02:27 PM     Duplex 3/28/17  Left: Consistent with thrombosis involving the distal femoral,  popliteal, soleal, posterior tibial, peroneal and soleal veins as  demonstrated by vein non-compressibility, and by a narrowing or  occlusion of the flow channel on color Doppler imaging. The remaining  segments, common femoral, deep femoral, proximal and mid femoral and  great saphenous are patent and without evidence of thrombus; each is  fully compressible and there is no narrowing of the flow channel on  color Doppler imaging. Phasic flow is observed in the common femoral  Vein. Duplex 4/27/17  1.  Continued thrombosis of the left popliteal and gastrocnemius veins  as detected on the previous exam done on 3-.  2. The left distal femoral, posterior tibial, peroneal, and calf  soleal veins (noted to contain thrombus on the previous exam), appear  to be free of thrombus on today's exam.    6/26/17  IMPRESSION:  There is continued evidence of left lower extremity DVT  involving the popliteal and gastrocnemius veins. The popliteal is  partially recanalized, which is some improvement over the previous  exam done on 4/27/2017, however, there still appears to be significant   narrowing of the flow channel. The gastrocnemius still appears to be occluded. 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 The common femoral, deep femoral, femoral, popliteal, posterior  tibial, peroneal, and great saphenous are patent and without evidence  of thrombus    Venous duplex 12/12/18: IMPRESSION:  There is evidence of vein thrombosis bilaterally, as  described above. The ultrasound appearance is more consistent with an   acute than a chronic process on the right. The ultrasound appearance   is more consistent with a chronic than an acute process on the left. There is an incidental finding of a prominent groin lymph node  bilaterally as described above. ASSESSMENT  Mr. Reinier Josue is a 46 y.o. male with no significant medical problems was diagnosed with a symptomatic left lower extremity DVT and bilateral symptomatic pulmonary emboli after an 11 hour car ride on 3/28/17. Travel of more than 8 hours duration is a modest risk factor for venous thromboembolism,  most commonly associated with airplane travel. Recommended 3 months of anticoagulation but had persistent symptoms and clot hence extended to 6 months and had switched to Xarelto at the 3 month david. Repeat duplex showed continued resolution of clot burden with only partial chronic popliteal vein thrombus and decision was made to stop Xarelto. He now comes in with recurrent bilateral DVT. Has been on Xarelto since 12/12/18 without issues. He had no known provoking factors prior to this occurrence. He has had 2 unprovoked DVTS and as such I recommended indefinite anticoagulation.     He does not have offsprings and hence genetic testing will not alter management    We also reiterated the importance of scheduling a screening colonoscopy. Will also obtain a CT scan to r/o an underlying malignany    He asked several questions which were answered to his appropriate satisfaction. PLAN  Continue Xarelto 15mg BID x 21 days then 20mg daily, will need this indefinitely   CT neck/chest/abd/pelvis to rule out malignancy  Will refer to GI to get a screening colonoscopy   No need for additional workup or genetic testing as he does not have children and this would not change our management    RTC 6 months or sooner if needed    I spent > 50% of this 25 min encounter in counseling    Martín Fonseca MD    Mr. Soni has a reminder for a \"due or due soon\" health maintenance. I have asked that he contact his primary care provider for follow-up on this health maintenance.

## 2019-01-02 ENCOUNTER — TELEPHONE (OUTPATIENT)
Dept: ONCOLOGY | Age: 54
End: 2019-01-02

## 2019-01-07 ENCOUNTER — HOSPITAL ENCOUNTER (OUTPATIENT)
Dept: CT IMAGING | Age: 54
Discharge: HOME | End: 2019-01-07
Attending: REGISTERED NURSE
Payer: COMMERCIAL

## 2019-01-07 DIAGNOSIS — Z12.9 CANCER SCREENING: ICD-10-CM

## 2019-01-07 DIAGNOSIS — I82.403 ACUTE DEEP VEIN THROMBOSIS (DVT) OF BOTH LOWER EXTREMITIES, UNSPECIFIED VEIN (HCC): ICD-10-CM

## 2019-01-07 PROCEDURE — 74011000258 HC RX REV CODE- 258: Performed by: RADIOLOGY

## 2019-01-07 PROCEDURE — 74177 CT ABD & PELVIS W/CONTRAST: CPT

## 2019-01-07 PROCEDURE — 70491 CT SOFT TISSUE NECK W/DYE: CPT

## 2019-01-07 PROCEDURE — 74011636320 HC RX REV CODE- 636/320: Performed by: RADIOLOGY

## 2019-01-07 PROCEDURE — 71260 CT THORAX DX C+: CPT

## 2019-01-07 RX ORDER — SODIUM CHLORIDE 0.9 % (FLUSH) 0.9 %
10 SYRINGE (ML) INJECTION
Status: COMPLETED | OUTPATIENT
Start: 2019-01-07 | End: 2019-01-07

## 2019-01-07 RX ADMIN — Medication 10 ML: at 13:07

## 2019-01-07 RX ADMIN — SODIUM CHLORIDE 100 ML: 900 INJECTION, SOLUTION INTRAVENOUS at 13:07

## 2019-01-07 RX ADMIN — IOPAMIDOL 100 ML: 755 INJECTION, SOLUTION INTRAVENOUS at 13:07

## 2019-01-17 ENCOUNTER — TELEPHONE (OUTPATIENT)
Dept: ONCOLOGY | Age: 54
End: 2019-01-17

## 2019-01-17 NOTE — TELEPHONE ENCOUNTER
Aurelia from [facility] called and stated that [facility] needs Cardiac clearance.   Please advise      CB#[phone]

## 2019-01-18 NOTE — TELEPHONE ENCOUNTER
Second Encounter      Aurelia from [facility] called and needs to know how long to hold Xarelto so patient can get a colonoscopy.     # [phone]

## 2019-06-19 ENCOUNTER — OFFICE VISIT (OUTPATIENT)
Dept: ONCOLOGY | Age: 54
End: 2019-06-19

## 2019-06-19 VITALS
SYSTOLIC BLOOD PRESSURE: 122 MMHG | TEMPERATURE: 97.7 F | BODY MASS INDEX: 29.03 KG/M2 | DIASTOLIC BLOOD PRESSURE: 81 MMHG | HEART RATE: 77 BPM | HEIGHT: 73 IN | WEIGHT: 219 LBS | OXYGEN SATURATION: 98 %

## 2019-06-19 DIAGNOSIS — I82.532 CHRONIC DEEP VEIN THROMBOSIS (DVT) OF POPLITEAL VEIN OF LEFT LOWER EXTREMITY (HCC): Primary | ICD-10-CM

## 2019-06-19 NOTE — PROGRESS NOTES
Hematology follow up    REASON FOR VISIT: DVT and PE  REQUESTED BY: Dr. Roe Willett: Mr. Lucy Hoskins is a 53 y.o. male with no significant past medical history presented to the ER with left leg swelling on 3/30/17. Lower extremity Dopplers on 3/28/17 consistent with thrombosis involving the distal femoral, popliteal, soleal, posterior tibial, peroneal and soleal veins on the left. CT chest on 3/28/17 demonstrated bilateral pulmonary emboli. CBC was notable for a slightly decreased platelet count of 704Z on 3/29/17, CMP was notable for a normal creatinine of 1.1. Patient was started on apixaban 10 mg 2 times daily on 3/30/17. He drove to [place] and back about a month prior to the DVT. He developed some left leg swelling right after his trip, this got progressively worse with pain . He then developed VILLA and dizziness, prompting his visit to the ER. Due to the provoked nature of the clot, 3 months of Xarelto were recommended. He had persistent symptoms and hence extended to additional 3 months on Xarelto. After addition 3 months venous duplex showed clot resolution and Xarelto was stopped. He called our office on 12/12 with right calf pain. Venous duplex was done and showed an acute right DVT and chronic left DVT. Hence indefinite AC was resumed. He also had a CTAP in Jan 2019 which was unremarkable. He is well . Occasionally left calf swells up after running but goes down with elevation. No pain. No SOB, Colonoscopy done and showed hyperplastic polyps. Denies SOB, CP, or cough. He rarely uses alcohol, has never smoked. Father had bladder cancer in his 76s, had a cousin with liver cancer.        Past Surgical History:   Procedure Laterality Date    HX HERNIA REPAIR Right     INGUINAL    HX ORTHOPAEDIC Right     MENISCUS REPAIR 3X    HX ROTATOR CUFF REPAIR Left        No Known Allergies    Current Outpatient Medications   Medication Sig Dispense Refill    OTHER CBD oil      rivaroxaban (XARELTO) 20 mg tab tablet Take 1 Tab by mouth daily. Starting 1/3/19 30 Tab 3       Social History     Socioeconomic History    Marital status: SINGLE     Spouse name: Not on file    Number of children: Not on file    Years of education: Not on file    Highest education level: Not on file   Tobacco Use    Smoking status: Never Smoker    Smokeless tobacco: Never Used   Substance and Sexual Activity    Alcohol use: Yes     Alcohol/week: 2.4 oz     Types: 4 Cans of beer per week    Drug use: No       Family History   Problem Relation Age of Onset    No Known Problems Mother     Cancer Father         BLADDER    No Known Problems Brother     No Known Problems Brother     Anesth Problems Neg Hx        Physical Examination:   Visit Vitals  /81   Pulse 77   Temp 97.7 °F (36.5 °C)   Ht 6' 1\" (1.854 m)   Wt 219 lb (99.3 kg)   SpO2 98%   BMI 28.89 kg/m²     General appearance - alert, well appearing, and in no distress  Mental status - oriented to person, place, and time  Mouth - mucous membranes moist, pharynx normal without lesions  Lymphatics - no palpable lymphadenopathy, no hepatosplenomegaly  Chest - clear to auscultation, no wheezes, rales or rhonchi, symmetric air entry  Abdomen - soft, nontender, nondistended, no masses or organomegaly, bowel sounds present  Ext: No swelling or discoloration    LABS  Lab Results   Component Value Date/Time    WBC 5.8 06/04/2018 12:43 PM    HGB 14.0 06/21/2018 03:29 AM    HCT 45.9 06/04/2018 12:43 PM    PLATELET 461 26/71/9755 12:43 PM    MCV 96.0 06/04/2018 12:43 PM    ABS.  NEUTROPHILS 5.7 03/28/2017 02:27 PM     Lab Results   Component Value Date/Time    Sodium 142 06/20/2018 03:36 AM    Potassium 4.3 06/20/2018 03:36 AM    Chloride 109 (H) 06/20/2018 03:36 AM    CO2 25 06/20/2018 03:36 AM    Glucose 137 (H) 06/20/2018 03:36 AM    BUN 13 06/20/2018 03:36 AM    Creatinine 0.91 06/20/2018 03:36 AM    GFR est AA >60 06/20/2018 03:36 AM    GFR est non-AA >60 06/20/2018 03:36 AM    Calcium 8.2 (L) 06/20/2018 03:36 AM     Lab Results   Component Value Date/Time    AST (SGOT) 19 03/28/2017 02:27 PM    Alk. phosphatase 81 03/28/2017 02:27 PM    Protein, total 7.8 03/28/2017 02:27 PM    Albumin 4.4 03/28/2017 02:27 PM    Globulin 3.4 03/28/2017 02:27 PM    A-G Ratio 1.3 03/28/2017 02:27 PM     Duplex 3/28/17  Left: Consistent with thrombosis involving the distal femoral,  popliteal, soleal, posterior tibial, peroneal and soleal veins as  demonstrated by vein non-compressibility, and by a narrowing or  occlusion of the flow channel on color Doppler imaging. The remaining  segments, common femoral, deep femoral, proximal and mid femoral and  great saphenous are patent and without evidence of thrombus; each is  fully compressible and there is no narrowing of the flow channel on  color Doppler imaging. Phasic flow is observed in the common femoral  Vein. Duplex 4/27/17  1. Continued thrombosis of the left popliteal and gastrocnemius veins  as detected on the previous exam done on 3-.  2. The left distal femoral, posterior tibial, peroneal, and calf  soleal veins (noted to contain thrombus on the previous exam), appear  to be free of thrombus on today's exam.    6/26/17  IMPRESSION:  There is continued evidence of left lower extremity DVT  involving the popliteal and gastrocnemius veins. The popliteal is  partially recanalized, which is some improvement over the previous  exam done on 4/27/2017, however, there still appears to be significant   narrowing of the flow channel. The gastrocnemius still appears to be occluded. 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   The common femoral, deep femoral, femoral, popliteal, posterior  tibial, peroneal, and great saphenous are patent and without evidence  of thrombus    Venous duplex 12/12/18: IMPRESSION:  There is evidence of vein thrombosis bilaterally, as  described above. The ultrasound appearance is more consistent with an   acute than a chronic process on the right. The ultrasound appearance   is more consistent with a chronic than an acute process on the left. There is an incidental finding of a prominent groin lymph node  bilaterally as described above. CT CAP 1/19  IMPRESSION  IMPRESSION:  No suspicious mass or lymphadenopathy in the neck, chest, abdomen, or pelvis. Heterogeneous prostate gland; correlate clinically. Incidental findings as  detailed above. ASSESSMENT  Mr. Monserrat Borges is a 53 y.o. male with no significant medical problems was diagnosed with a symptomatic left lower extremity DVT and bilateral symptomatic pulmonary emboli after an 11 hour car ride on 3/28/17. Travel of more than 8 hours duration is a modest risk factor for venous thromboembolism,  most commonly associated with airplane travel. Recommended 3 months of anticoagulation but had persistent symptoms and clot hence extended to 6 months and had switched to Xarelto at the 3 month david. Repeat duplex showed continued resolution of clot burden with only partial chronic popliteal vein thrombus and decision was made to stop Xarelto. He now comes in with recurrent bilateral DVT. Has been on Xarelto since 12/12/18 without issues. He had no known provoking factors prior to this occurrence. He has had 2 unprovoked DVTS and as such I recommended indefinite anticoagulation. He does not have offsprings and hence genetic testing will not alter management    He had a CT and colonoscopy without any evidence of malignancy. He is tolerating Xarelto and will continue indefinitely      PLAN  Continue Xarelto 20mg daily indefinitely      RTC yearly        Tangela Mack MD    Mr. Soni has a reminder for a \"due or due soon\" health maintenance.  I have asked that he contact his primary care provider for follow-up on this health maintenance.

## 2019-06-19 NOTE — PROGRESS NOTES
Millie Acosta is a 53 y.o. male  Chief Complaint   Patient presents with    Blood Clot     follow up      1. Have you been to the ER, urgent care clinic since your last visit? Hospitalized since your last visit? No     2. Have you seen or consulted any other health care providers outside of the [address] [place] [address] since your last visit?  No

## 2019-12-02 ENCOUNTER — TELEPHONE (OUTPATIENT)
Dept: ONCOLOGY | Age: 54
End: 2019-12-02

## 2019-12-02 DIAGNOSIS — I82.409 ACUTE DEEP VEIN THROMBOSIS (DVT) OF LOWER EXTREMITY, UNSPECIFIED LATERALITY, UNSPECIFIED VEIN (HCC): ICD-10-CM

## 2019-12-02 NOTE — TELEPHONE ENCOUNTER
Patient called and stated that he would like to know if he can get his xarelto as a 90 prescription.  # [phone]

## 2019-12-02 NOTE — TELEPHONE ENCOUNTER
Patient called and stated that he was returning a call to [facility].  # [phone]

## 2019-12-02 NOTE — TELEPHONE ENCOUNTER
Returned pt call. HIPPA verified . X2 verifiers.  Informed pt that prescription was sent to pharmacy

## 2020-06-19 ENCOUNTER — VIRTUAL VISIT (OUTPATIENT)
Dept: ONCOLOGY | Age: 55
End: 2020-06-19

## 2020-06-19 ENCOUNTER — TELEPHONE (OUTPATIENT)
Dept: ONCOLOGY | Age: 55
End: 2020-06-19

## 2020-06-19 DIAGNOSIS — I82.409 ACUTE DEEP VEIN THROMBOSIS (DVT) OF LOWER EXTREMITY, UNSPECIFIED LATERALITY, UNSPECIFIED VEIN (HCC): ICD-10-CM

## 2020-06-19 NOTE — PROGRESS NOTES
Hematology follow up    REASON FOR VISIT: DVT and PE    TYPE OF VISIT: Patito Iglesias is a 63 y.o. male who is seen by synchronous (real-time) audio-video technology on 6/19/2020 for follow up of above    REQUESTED BY: Dr. Javier Luu: Mr. Kit Leno is a 63 y.o. male with no significant past medical history presented to the ER with left leg swelling on 3/30/17. Lower extremity Dopplers on 3/28/17 consistent with thrombosis involving the distal femoral, popliteal, soleal, posterior tibial, peroneal and soleal veins on the left. CT chest on 3/28/17 demonstrated bilateral pulmonary emboli. CBC was notable for a slightly decreased platelet count of 542V on 3/29/17, CMP was notable for a normal creatinine of 1.1. Patient was started on apixaban 10 mg 2 times daily on 3/30/17. He drove to [place] and back about a month prior to the DVT. He developed some left leg swelling right after his trip, this got progressively worse with pain . He then developed VILLA and dizziness, prompting his visit to the ER. Due to the provoked nature of the clot, 3 months of Xarelto were recommended. He had persistent symptoms and hence extended to additional 3 months on Xarelto. After addition 3 months venous duplex showed clot resolution and Xarelto was stopped. He called our office on 12/12 with right calf pain. Venous duplex was done and showed an acute right DVT and chronic left DVT. Hence indefinite AC was resumed. He also had a CTAP in Jan 2019 which was unremarkable. Seen for follow up    He is well . He has been active . Occasionally left calf is crampy. No new swelling. Colonoscopy 2019 done and showed hyperplastic polyps. Denies SOB, CP, or cough. He rarely uses alcohol, has never smoked. Father had bladder cancer in his 76s, had a cousin with liver cancer.        Past Surgical History:   Procedure Laterality Date    HX HERNIA REPAIR Right     INGUINAL    HX ORTHOPAEDIC Right     MENISCUS REPAIR 3X    HX ROTATOR CUFF REPAIR Left        No Known Allergies    Current Outpatient Medications   Medication Sig Dispense Refill    rivaroxaban (XARELTO) 20 mg tab tablet Take 1 Tab by mouth daily. Starting 1/3/19  Indications: blood clot in a deep vein of the extremities 90 Tab 3    OTHER CBD oil         Social History     Socioeconomic History    Marital status: SINGLE     Spouse name: Not on file    Number of children: Not on file    Years of education: Not on file    Highest education level: Not on file   Tobacco Use    Smoking status: Never Smoker    Smokeless tobacco: Never Used   Substance and Sexual Activity    Alcohol use: Yes     Alcohol/week: 4.0 standard drinks     Types: 4 Cans of beer per week    Drug use: No       Family History   Problem Relation Age of Onset    No Known Problems Mother     Cancer Father         BLADDER    No Known Problems Brother     No Known Problems Brother     Anesth Problems Neg Hx        Physical Examination:     Due to this being a TeleHealth evaluation, many elements of the physical examination are unable to be assessed. Constitutional: Appears well-developed and well-nourished in no apparent distress   Mental status: Alert and awake, Oriented to person/place/time, Able to follow commands  Eyes: EOM normal, Sclera normal, No visible ocular discharge  HENT: Normocephalic, atraumatic; Mouth/Throat: Moist mucous membranes, External Ears normal  Neck: No visualized mass  Skin: No significant exanthematous lesions or discoloration noted on facial skin  Psychiatric: Normal affect, normal judgment/insight. No hallucinations       LABS  Lab Results   Component Value Date/Time    WBC 5.8 06/04/2018 12:43 PM    HGB 14.0 06/21/2018 03:29 AM    HCT 45.9 06/04/2018 12:43 PM    PLATELET 021 28/66/6294 12:43 PM    MCV 96.0 06/04/2018 12:43 PM    ABS.  NEUTROPHILS 5.7 03/28/2017 02:27 PM     Lab Results   Component Value Date/Time    Sodium 142 06/20/2018 03:36 AM    Potassium 4.3 06/20/2018 03:36 AM    Chloride 109 (H) 06/20/2018 03:36 AM    CO2 25 06/20/2018 03:36 AM    Glucose 137 (H) 06/20/2018 03:36 AM    BUN 13 06/20/2018 03:36 AM    Creatinine 0.91 06/20/2018 03:36 AM    GFR est AA >60 06/20/2018 03:36 AM    GFR est non-AA >60 06/20/2018 03:36 AM    Calcium 8.2 (L) 06/20/2018 03:36 AM     Lab Results   Component Value Date/Time    Alk. phosphatase 81 03/28/2017 02:27 PM    Protein, total 7.8 03/28/2017 02:27 PM    Albumin 4.4 03/28/2017 02:27 PM    Globulin 3.4 03/28/2017 02:27 PM    A-G Ratio 1.3 03/28/2017 02:27 PM     Duplex 3/28/17  Left: Consistent with thrombosis involving the distal femoral,  popliteal, soleal, posterior tibial, peroneal and soleal veins as  demonstrated by vein non-compressibility, and by a narrowing or  occlusion of the flow channel on color Doppler imaging. The remaining  segments, common femoral, deep femoral, proximal and mid femoral and  great saphenous are patent and without evidence of thrombus; each is  fully compressible and there is no narrowing of the flow channel on  color Doppler imaging. Phasic flow is observed in the common femoral  Vein. Duplex 4/27/17  1. Continued thrombosis of the left popliteal and gastrocnemius veins  as detected on the previous exam done on 3-.  2. The left distal femoral, posterior tibial, peroneal, and calf  soleal veins (noted to contain thrombus on the previous exam), appear  to be free of thrombus on today's exam.    6/26/17  IMPRESSION:  There is continued evidence of left lower extremity DVT  involving the popliteal and gastrocnemius veins. The popliteal is  partially recanalized, which is some improvement over the previous  exam done on 4/27/2017, however, there still appears to be significant   narrowing of the flow channel. The gastrocnemius still appears to be occluded.     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 The common femoral, deep femoral, femoral, popliteal, posterior  tibial, peroneal, and great saphenous are patent and without evidence  of thrombus    Venous duplex 12/12/18: IMPRESSION:  There is evidence of vein thrombosis bilaterally, as  described above. The ultrasound appearance is more consistent with an   acute than a chronic process on the right. The ultrasound appearance   is more consistent with a chronic than an acute process on the left. There is an incidental finding of a prominent groin lymph node  bilaterally as described above. CT CAP 1/19  IMPRESSION  IMPRESSION:  No suspicious mass or lymphadenopathy in the neck, chest, abdomen, or pelvis. Heterogeneous prostate gland; correlate clinically. Incidental findings as  detailed above. ASSESSMENT  Mr. Shmuel Flores is a 63 y.o. male with no significant medical problems was diagnosed with a symptomatic left lower extremity DVT and bilateral symptomatic pulmonary emboli after an 11 hour car ride on 3/28/17. Travel of more than 8 hours duration is a modest risk factor for venous thromboembolism,  most commonly associated with airplane travel. Recommended 3 months of anticoagulation but had persistent symptoms and clot hence extended to 6 months and had switched to Xarelto at the 3 month david. Repeat duplex showed continued resolution of clot burden with only partial chronic popliteal vein thrombus and decision was made to stop Xarelto. He now comes in with recurrent bilateral DVT. Has been on Xarelto since 12/12/18 without issues. He had no known provoking factors prior to this occurrence. He has had 2 unprovoked DVTS and as such I recommended indefinite anticoagulation. He does not have offsprings and hence genetic testing will not alter management    He had a CT and colonoscopy without any evidence of malignancy.      He is tolerating Xarelto and will continue indefinitely    Refilled      PLAN  Continue Xarelto 20mg daily indefinitely      RTC yearly, call with concerns        Levi Martinez MD    Mr. Soni has a reminder for a \"due or due soon\" health maintenance. I have asked that he contact his primary care provider for follow-up on this health maintenance. I was in the office while conducting this encounter. The patient was at his home. Consent:  He and/or his healthcare decision maker is aware that this patient-initiated Telehealth encounter is a billable service, with coverage as determined by his insurance carrier. He is aware that he may receive a bill and has provided verbal consent to proceed: Yes    Pursuant to the emergency declaration under the [address] and the [facility] waiver authority and the [employer] Act, this Virtual  Visit was conducted, with patient's (and/or legal guardian's) consent, to reduce the patient's risk of exposure to COVID-19 and provide necessary medical care. Services were provided through a video synchronous discussion virtually to substitute for in-person visit.

## 2020-06-19 NOTE — PROGRESS NOTES
Steven Barber is a 63 y.o. male  Chief Complaint   Patient presents with    Follow-up     DVT and PE      1. Have you been to the ER, urgent care clinic since your last visit? Hospitalized since your last visit? No    2. Have you seen or consulted any other health care providers outside of the [address] [place] [address] since your last visit? Include any pap smears or colon screening.  No

## 2021-06-28 ENCOUNTER — TELEPHONE (OUTPATIENT)
Dept: ONCOLOGY | Age: 56
End: 2021-06-28

## 2021-06-28 NOTE — TELEPHONE ENCOUNTER
Pt called and stated pharm needs authorization for Xarelto to be sent over. I informed the patient we have not seen him in over a year so im not sure if that's something we can do but I will ask.     Cb# [phone]

## 2021-08-03 ENCOUNTER — OFFICE VISIT (OUTPATIENT)
Dept: ONCOLOGY | Age: 56
End: 2021-08-03
Payer: COMMERCIAL

## 2021-08-03 VITALS
HEART RATE: 61 BPM | BODY MASS INDEX: 30.21 KG/M2 | OXYGEN SATURATION: 97 % | WEIGHT: 229 LBS | TEMPERATURE: 98.4 F | RESPIRATION RATE: 18 BRPM | DIASTOLIC BLOOD PRESSURE: 86 MMHG | SYSTOLIC BLOOD PRESSURE: 136 MMHG

## 2021-08-03 DIAGNOSIS — I82.409 ACUTE DEEP VEIN THROMBOSIS (DVT) OF LOWER EXTREMITY, UNSPECIFIED LATERALITY, UNSPECIFIED VEIN (HCC): ICD-10-CM

## 2021-08-03 PROCEDURE — 99213 OFFICE O/P EST LOW 20 MIN: CPT | Performed by: INTERNAL MEDICINE

## 2021-08-03 NOTE — PROGRESS NOTES
Rowena Alicea is a 61 y.o. male    Chief Complaint   Patient presents with    Follow-up       1. Have you been to the ER, urgent care clinic since your last visit? Hospitalized since your last visit? No    2. Have you seen or consulted any other health care providers outside of the [address] [place] [address] since your last visit? Include any pap smears or colon screening.  No

## 2021-08-03 NOTE — PROGRESS NOTES
Hematology follow up    REASON FOR VISIT: DVT and PE    TYPE OF VISIT: Otilia Gordon is a 61 y.o. male who is seen for follow up on 8/3/2021 for above    REQUESTED BY: Dr. Ned Fink: Mr. Gale Hubbard is a 61 y.o. male with no significant past medical history presented to the ER with left leg swelling on 3/30/17. Lower extremity Dopplers on 3/28/17 consistent with thrombosis involving the distal femoral, popliteal, soleal, posterior tibial, peroneal and soleal veins on the left. CT chest on 3/28/17 demonstrated bilateral pulmonary emboli. CBC was notable for a slightly decreased platelet count of 033P on 3/29/17, CMP was notable for a normal creatinine of 1.1. Patient was started on apixaban 10 mg 2 times daily on 3/30/17. He drove to [place] and back about a month prior to the DVT. He developed some left leg swelling right after his trip, this got progressively worse with pain . He then developed VILLA and dizziness, prompting his visit to the ER. Due to the provoked nature of the clot, 3 months of Xarelto were recommended. He had persistent symptoms and hence extended to additional 3 months on Xarelto. After addition 3 months venous duplex showed clot resolution and Xarelto was stopped. He called our office on 12/12 with right calf pain. Venous duplex was done and showed an acute right DVT and chronic left DVT. Hence indefinite AC was resumed. He also had a CTAP in Jan 2019 which was unremarkable. Seen for follow up. He is well . No bleeding on Xarelto, no fevers, no leg swelling, has had some knee arthritis  He rarely uses alcohol, has never smoked. Father had bladder cancer in his 76s, had a cousin with liver cancer.        Past Surgical History:   Procedure Laterality Date    HX HERNIA REPAIR Right     INGUINAL    HX ORTHOPAEDIC Right     MENISCUS REPAIR 3X    HX ROTATOR CUFF REPAIR Left        No Known Allergies    Current Outpatient Medications   Medication Sig Dispense Refill    rivaroxaban (Xarelto) 20 mg tab tablet Take 1 Tab by mouth daily. Starting 1/3/19  Indications: blood clot in a deep vein of the extremities 90 Tab 4    OTHER CBD oil         Social History     Socioeconomic History    Marital status: SINGLE     Spouse name: Not on file    Number of children: Not on file    Years of education: Not on file    Highest education level: Not on file   Tobacco Use    Smoking status: Never Smoker    Smokeless tobacco: Never Used   Substance and Sexual Activity    Alcohol use: Yes     Alcohol/week: 4.0 standard drinks     Types: 4 Cans of beer per week    Drug use: No     Social Determinants of Health     Financial Resource Strain:     Difficulty of Paying Living Expenses:    Food Insecurity:     Worried About Running Out of Food in the Last Year:     [address] in the Last Year:    Transportation Needs:     Lack of Transportation (Medical):      Lack of Transportation (Non-Medical):    Physical Activity:     Days of Exercise per Week:     Minutes of Exercise per Session:    Stress:     Feeling of Stress :    Social Connections:     Frequency of Communication with Friends and Family:     Frequency of Social Gatherings with Friends and Family:     Attends [employer] Services:     Active Member of Clubs or Organizations:     Attends Club or Organization Meetings:     Marital Status:        Family History   Problem Relation Age of Onset    No Known Problems Mother     Cancer Father         BLADDER    No Known Problems Brother     No Known Problems Brother     Anesth Problems Neg Hx        Physical Examination:     Vitals reviewed   Constitutional: Appears well-developed and well-nourished in no apparent distress   Mental status: Alert and awake, Oriented to person/place/time, Able to follow commands  Eyes: EOM normal, Sclera normal, No visible ocular discharge  HENT: Normocephalic, atraumatic  Neck: No visualized mass  Skin: No significant exanthematous lesions or discoloration noted on facial skin  Psychiatric: Normal affect, normal judgment/insight. No hallucinations       LABS  Lab Results   Component Value Date/Time    WBC 5.8 06/04/2018 12:43 PM    HGB 14.0 06/21/2018 03:29 AM    HCT 45.9 06/04/2018 12:43 PM    PLATELET 306 23/92/5528 12:43 PM    MCV 96.0 06/04/2018 12:43 PM    ABS. NEUTROPHILS 5.7 03/28/2017 02:27 PM     Lab Results   Component Value Date/Time    Sodium 142 06/20/2018 03:36 AM    Potassium 4.3 06/20/2018 03:36 AM    Chloride 109 (H) 06/20/2018 03:36 AM    CO2 25 06/20/2018 03:36 AM    Glucose 137 (H) 06/20/2018 03:36 AM    BUN 13 06/20/2018 03:36 AM    Creatinine 0.91 06/20/2018 03:36 AM    GFR est AA >60 06/20/2018 03:36 AM    GFR est non-AA >60 06/20/2018 03:36 AM    Calcium 8.2 (L) 06/20/2018 03:36 AM     Lab Results   Component Value Date/Time    Alk. phosphatase 81 03/28/2017 02:27 PM    Protein, total 7.8 03/28/2017 02:27 PM    Albumin 4.4 03/28/2017 02:27 PM    Globulin 3.4 03/28/2017 02:27 PM    A-G Ratio 1.3 03/28/2017 02:27 PM     Duplex 3/28/17  Left: Consistent with thrombosis involving the distal femoral,  popliteal, soleal, posterior tibial, peroneal and soleal veins as  demonstrated by vein non-compressibility, and by a narrowing or  occlusion of the flow channel on color Doppler imaging. The remaining  segments, common femoral, deep femoral, proximal and mid femoral and  great saphenous are patent and without evidence of thrombus; each is  fully compressible and there is no narrowing of the flow channel on  color Doppler imaging. Phasic flow is observed in the common femoral  Vein. Duplex 4/27/17  1.  Continued thrombosis of the left popliteal and gastrocnemius veins  as detected on the previous exam done on 3-.  2. The left distal femoral, posterior tibial, peroneal, and calf  soleal veins (noted to contain thrombus on the previous exam), appear  to be free of thrombus on today's exam.    6/26/17  IMPRESSION:  There is continued evidence of left lower extremity DVT  involving the popliteal and gastrocnemius veins. The popliteal is  partially recanalized, which is some improvement over the previous  exam done on 4/27/2017, however, there still appears to be significant   narrowing of the flow channel. The gastrocnemius still appears to be occluded. 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 The common femoral, deep femoral, femoral, popliteal, posterior  tibial, peroneal, and great saphenous are patent and without evidence  of thrombus    Venous duplex 12/12/18: IMPRESSION:  There is evidence of vein thrombosis bilaterally, as  described above. The ultrasound appearance is more consistent with an   acute than a chronic process on the right. The ultrasound appearance   is more consistent with a chronic than an acute process on the left. There is an incidental finding of a prominent groin lymph node  bilaterally as described above. CT CAP 1/19  IMPRESSION  IMPRESSION:  No suspicious mass or lymphadenopathy in the neck, chest, abdomen, or pelvis. Heterogeneous prostate gland; correlate clinically. Incidental findings as  detailed above. ASSESSMENT  Mr. Kyle Peres is a 61 y.o. male with no significant medical problems was diagnosed with a symptomatic left lower extremity DVT and bilateral symptomatic pulmonary emboli after an 11 hour car ride on 3/28/17. Travel of more than 8 hours duration is a modest risk factor for venous thromboembolism,  most commonly associated with airplane travel. Recommended 3 months of anticoagulation but had persistent symptoms and clot hence extended to 6 months and had switched to Xarelto at the 3 month david.  Repeat duplex showed continued resolution of clot burden with only partial chronic popliteal vein thrombus and decision was made to stop Xarelto. He now comes in with recurrent bilateral DVT. Has been on Xarelto since 12/12/18 without issues. He had no known provoking factors prior to this occurrence. He has had 2 unprovoked DVTS and as such I recommended indefinite anticoagulation. He does not have offsprings and hence genetic testing will not alter management    He had a CT and colonoscopy without any evidence of malignancy.      He is tolerating Xarelto and will continue indefinitely    Refilled    He can take Meloxicam for arthritis      PLAN  Continue Xarelto 20mg daily indefinitely  Refilled       RTC yearly, but if you establish with a PCP you dont need to see us      Sylvia Wright MD

## 2021-12-28 NOTE — PROGRESS NOTES
HPI: Ishmael Shah (: 1965) is a 61 y.o. male, patient, here for evaluation of the following chief complaint(s):    Elbow Pain (right elbow) and Thumb Pain (right)  Patient seen today to evaluate his right arm and hand. He has complained since the summer of right elbow lateral epicondylar type pain. He denied any specific fall or other injury. He is a past patient with Dr. Ameena Foster and had a left lateral epicondyle injection performed that helped. He also had a left trigger thumb injection on 2020. He reports that this was actually his second injection. He complains of catching triggering locking involving the right thumb and is seen for further treatment. Vitals: There were no vitals taken for this visit. There is no height or weight on file to calculate BMI. No Known Allergies    Current Outpatient Medications   Medication Sig    rivaroxaban (Xarelto) 20 mg tab tablet Take 1 Tablet by mouth daily. Starting 1/3/19  Indications: blood clot in a deep vein of the extremities    OTHER CBD oil     No current facility-administered medications for this visit. Past Medical History:   Diagnosis Date    Arthritis     KNEE    Hemorrhoids     Nausea & vomiting     Pulmonary embolism ([address]     Thromboemb[address] [address] 2017    LT. LEG        Past Surgical History:   Procedure Laterality Date    HX HERNIA REPAIR Right     INGUINAL    HX ORTHOPAEDIC Right     MENISCUS REPAIR 3X    HX ROTATOR CUFF REPAIR Left        Family History   Problem Relation Age of Onset    No Known Problems Mother     Cancer Father         BLADDER    No Known Problems Brother     No Known Problems Brother     Anesth Problems Neg Hx         Social History     Tobacco Use    Smoking status: Never Smoker    Smokeless tobacco: Never Used   Substance Use Topics    Alcohol use:  Yes     Alcohol/week: 4.0 standard drinks     Types: 4 Cans of beer per week    Drug use: No        Review of Systems    ROS     Negative for: Constitutional, Gastrointestinal, Neurological, Skin, Genitourinary, HENT, Endocrine, Cardiovascular, Eyes, Respiratory, Psychiatric, Allergic/Imm, Heme/Lymph    Other comments for: Musculoskeletal (right thumb and elbow)    Last edited by Vince Briceño RN on 12/30/2021  9:59 AM. (History)             Physical Exam    Patient's right elbow has point tenderness to the lateral condyle and pain was worsened during elbow extension resisted wrist extension testing. He also had tenderness at the A1 pulley of the right thumb with clicking and locking. No other digital involvement and good sensation throughout. Normal left elbow examination of the left wrist reveals no swelling, edema or warmth, no tenderness to palpation, no pain, normal strength and tone, normal range of motion, no crepitus, normal sensation, no instability or laxity and no known fractures or deformities. Examination of the left hand reveals no tenderness to palpation, no pain, normal  strength, normal tone, normal range of motion, no crepitus, no instability or laxity, no known fractures or deformities and normal sensation. Imaging:    No new x-rays today. Patient    ASSESSMENT/PLAN:  Below is the assessment and plan developed based on review of pertinent history, physical exam, labs, studies, and medications. The patient examination was consistent with right elbow lateral condyle-itis and right thumb stenosing tenosynovitis with locking. He tolerated injection therapy well for the right elbow and the right trigger thumb on 12/30/2021. I recommended home exercises and ice friction massage when needed. Follow-up in 3 to 4 weeks.     1. Trigger finger of right thumb  -     INJECT TENDON SHEATH/LIGAMENT  -     DRAIN/INJECT SMALL JOINT/BURSA  -     triamcinolone acetonide (KENALOG-40) 40 mg/mL injection 40 mg; 40 mg, Intra artICUlar, ONCE, 1 dose, On Thu 12/30/21 at 1000  -     lidocaine-EPINEPHrine (XYLOCAINE) 1 %-1:100,000 injection 10 mg; 10 mg, IntraDERMal, ONCE, 1 dose, On u 12/30/21 at 1000  2. Right hand pain  3. Lateral epicondylitis of right elbow  -     INJECT TENDON SHEATH/LIGAMENT  -     DRAIN/INJECT SMALL JOINT/BURSA  -     triamcinolone acetonide (KENALOG-40) 40 mg/mL injection 40 mg; 40 mg, Intra artICUlar, ONCE, 1 dose, On Thu 12/30/21 at 1000  -     lidocaine-EPINEPHrine (XYLOCAINE) 1 %-1:100,000 injection 10 mg; 10 mg, IntraDERMal, ONCE, 1 dose, On u 12/30/21 at 1000  4. Right elbow pain    Informed consent was obtained the patient received a right elbow lateral epicondylar corticosteroid injection with 40 mg of triamcinolone mixed with 1 cc 1% lidocaine. Informed consent was obtained and the patient received a right trigger thumb A1 pulley corticosteroid injection with 40 mg of triamcinolone mixed with 1 cc 1% articaine. Return in about 4 weeks (around 1/27/2022). An electronic signature was used to authenticate this note.   -- Ana Cochran MD

## 2021-12-30 ENCOUNTER — OFFICE VISIT (OUTPATIENT)
Dept: ORTHOPEDIC SURGERY | Age: 56
End: 2021-12-30
Payer: COMMERCIAL

## 2021-12-30 DIAGNOSIS — M79.641 RIGHT HAND PAIN: ICD-10-CM

## 2021-12-30 DIAGNOSIS — M65.311 TRIGGER FINGER OF RIGHT THUMB: Primary | ICD-10-CM

## 2021-12-30 DIAGNOSIS — M25.521 RIGHT ELBOW PAIN: ICD-10-CM

## 2021-12-30 DIAGNOSIS — M77.11 LATERAL EPICONDYLITIS OF RIGHT ELBOW: ICD-10-CM

## 2021-12-30 PROCEDURE — 20600 DRAIN/INJ JOINT/BURSA W/O US: CPT | Performed by: ORTHOPAEDIC SURGERY

## 2021-12-30 PROCEDURE — 20551 NJX 1 TENDON ORIGIN/INSJ: CPT | Performed by: ORTHOPAEDIC SURGERY

## 2021-12-30 PROCEDURE — 99213 OFFICE O/P EST LOW 20 MIN: CPT | Performed by: ORTHOPAEDIC SURGERY

## 2021-12-30 RX ORDER — LIDOCAINE HYDROCHLORIDE AND EPINEPHRINE 10; 10 MG/ML; UG/ML
10 INJECTION, SOLUTION INFILTRATION; PERINEURAL ONCE
Status: COMPLETED | OUTPATIENT
Start: 2021-12-30 | End: 2021-12-30

## 2021-12-30 RX ORDER — TRIAMCINOLONE ACETONIDE 40 MG/ML
40 INJECTION, SUSPENSION INTRA-ARTICULAR; INTRAMUSCULAR ONCE
Status: COMPLETED | OUTPATIENT
Start: 2021-12-30 | End: 2021-12-30

## 2021-12-30 RX ADMIN — TRIAMCINOLONE ACETONIDE 40 MG: 40 INJECTION, SUSPENSION INTRA-ARTICULAR; INTRAMUSCULAR at 10:01

## 2021-12-30 RX ADMIN — LIDOCAINE HYDROCHLORIDE AND EPINEPHRINE 10 MG: 10; 10 INJECTION, SOLUTION INFILTRATION; PERINEURAL at 10:56

## 2021-12-30 RX ADMIN — LIDOCAINE HYDROCHLORIDE AND EPINEPHRINE 10 MG: 10; 10 INJECTION, SOLUTION INFILTRATION; PERINEURAL at 09:59

## 2021-12-30 RX ADMIN — TRIAMCINOLONE ACETONIDE 40 MG: 40 INJECTION, SUSPENSION INTRA-ARTICULAR; INTRAMUSCULAR at 10:00

## 2021-12-30 NOTE — PATIENT INSTRUCTIONS
Trigger Finger: Care Instructions  Overview  A trigger finger is a finger stuck in a bent position. It happens when the tendon that bends and straightens the thumb or finger can't slide smoothly under the ligaments that hold the tendon against the bones. In most cases, it's caused by a bump (nodule) that forms on the tendon. The bent finger usually straightens out on its own. A trigger finger can be painful. But it normally isn't a serious problem. Trigger fingers seem to occur more in some groups of people. These groups include:  · People who have diabetes or arthritis. · People who have injured their hands in the past.  · Musicians. · People who  tools often. Rest and exercises may help your finger relax so that it can bend. You may get a corticosteroid shot. This can reduce swelling and pain. Your doctor may put a splint on your finger. It will give your finger some rest. You may need surgery if the finger keeps locking in a bent position. Follow-up care is a key part of your treatment and safety. Be sure to make and go to all appointments, and call your doctor if you are having problems. It's also a good idea to know your test results and keep a list of the medicines you take. How can you care for yourself at home? · If your doctor put a splint on your finger, wear it as directed. Don't take it off until your doctor says you can. · You may need to change your activities to avoid movements that irritate the finger. · Take your medicines exactly as prescribed. Call your doctor if you think you are having a problem with your medicine. · Ask your doctor if you can take an over-the-counter pain medicine, such as acetaminophen (Tylenol), ibuprofen (Advil, Motrin), or naproxen (Aleve). Be safe with medicines. Read and follow all instructions on the label. · If your doctor recommends exercises, do them as directed. When should you call for help?    Call your doctor now or seek immediate medical care if:    · Your finger locks in a bent position and will not straighten. Watch closely for changes in your health, and be sure to contact your doctor if:    · You do not get better as expected. Where can you learn more? Go to http://www.mcdonough.com/  Enter M826 in the search box to learn more about \"Trigger Finger: Care Instructions. \"  Current as of: July 1, 2021               Content Version: 13.0  © 2006-2021 [employer]. Care instructions adapted under license by [employer] (which disclaims liability or warranty for this information). If you have questions about a medical condition or this instruction, always ask your healthcare professional. [place] [address] any warranty or liability for your use of this information. Trigger Finger: Care Instructions  Overview  A trigger finger is a finger stuck in a bent position. It happens when the tendon that bends and straightens the thumb or finger can't slide smoothly under the ligaments that hold the tendon against the bones. In most cases, it's caused by a bump (nodule) that forms on the tendon. The bent finger usually straightens out on its own. A trigger finger can be painful. But it normally isn't a serious problem. Trigger fingers seem to occur more in some groups of people. These groups include:  · People who have diabetes or arthritis. · People who have injured their hands in the past.  · Musicians. · People who  tools often. Rest and exercises may help your finger relax so that it can bend. You may get a corticosteroid shot. This can reduce swelling and pain. Your doctor may put a splint on your finger. It will give your finger some rest. You may need surgery if the finger keeps locking in a bent position. Follow-up care is a key part of your treatment and safety. Be sure to make and go to all appointments, and call your doctor if you are having problems.  It's also a good idea to know your test results and keep a list of the medicines you take. How can you care for yourself at home? · If your doctor put a splint on your finger, wear it as directed. Don't take it off until your doctor says you can. · You may need to change your activities to avoid movements that irritate the finger. · Take your medicines exactly as prescribed. Call your doctor if you think you are having a problem with your medicine. · Ask your doctor if you can take an over-the-counter pain medicine, such as acetaminophen (Tylenol), ibuprofen (Advil, Motrin), or naproxen (Aleve). Be safe with medicines. Read and follow all instructions on the label. · If your doctor recommends exercises, do them as directed. When should you call for help? Call your doctor now or seek immediate medical care if:    · Your finger locks in a bent position and will not straighten. Watch closely for changes in your health, and be sure to contact your doctor if:    · You do not get better as expected. Where can you learn more? Go to http://www.mcdonough.com/  Enter M826 in the search box to learn more about \"Trigger Finger: Care Instructions. \"  Current as of: July 1, 2021               Content Version: 13.0  © 2681-7995 Healthwise[employer]. Care instructions adapted under license by [employer] (which disclaims liability or warranty for this information). If you have questions about a medical condition or this instruction, always ask your healthcare professional. [place] [address] any warranty or liability for your use of this information. Tennis Elbow: Exercises  Introduction  Here are some examples of exercises for you to try. The exercises may be suggested for a condition or for rehabilitation. Start each exercise slowly. Ease off the exercises if you start to have pain.   You will be told when to start these exercises and which ones will work best for you.  How to do the exercises  Wrist flexor stretch    1. Extend your arm in front of you with your palm up. 2. Bend your wrist, pointing your hand toward the floor. 3. With your other hand, gently bend your wrist farther until you feel a mild to moderate stretch in your forearm. 4. Hold for at least 15 to 30 seconds. Repeat 2 to 4 times. Wrist extensor stretch    1. Repeat steps 1 to 4 of the stretch above but begin with your extended hand palm down. Ball or sock squeeze    1. Hold a tennis ball (or a rolled-up sock) in your hand. 2. Make a fist around the ball (or sock) and squeeze. 3. Hold for about 6 seconds, and then relax for up to 10 seconds. 4. Repeat 8 to 12 times. 5. Switch the ball (or sock) to your other hand and do 8 to 12 times. Wrist deviation    1. Sit so that your arm is supported but your hand hangs off the edge of a flat surface, such as a table. 2. Hold your hand out like you are shaking hands with someone. 3. Move your hand up and down. 4. Repeat this motion 8 to 12 times. 5. Switch arms. 6. Try to do this exercise twice with each hand. Wrist curls    1. Place your forearm on a table with your hand hanging over the edge of the table, palm up. 2. Place a 1- to 2-pound weight in your hand. This may be a dumbbell, a can of food, or a filled water bottle. 3. Slowly raise and lower the weight while keeping your forearm on the table and your palm facing up. 4. Repeat this motion 8 to 12 times. 5. Switch arms, and do steps 1 through 4.  6. Repeat with your hand facing down toward the floor. Switch arms. Biceps curls    1. Sit leaning forward with your legs slightly spread and your left hand on your left thigh. 2. Place your right elbow on your right thigh, and hold the weight with your forearm horizontal.  3. Slowly curl the weight up and toward your chest.  4. Repeat this motion 8 to 12 times. 5. Switch arms, and do steps 1 through 4.   Follow-up care is a key part of your treatment and safety. Be sure to make and go to all appointments, and call your doctor if you are having problems. It's also a good idea to know your test results and keep a list of the medicines you take. Where can you learn more? Go to http://www.gray.com/  Enter Q490 in the search box to learn more about \"Tennis Elbow: Exercises. \"  Current as of: July 1, 2021               Content Version: 13.0  © 2006-2021 Healthwise, [employer]. Care instructions adapted under license by [employer] (which disclaims liability or warranty for this information). If you have questions about a medical condition or this instruction, always ask your healthcare professional. [address] any warranty or liability for your use of this information.

## 2022-03-18 PROBLEM — I82.4Z2 ACUTE EMBOLISM AND THROMBOSIS OF DEEP VEIN OF LEFT DISTAL LOWER EXTREMITY (HCC): Status: ACTIVE | Noted: 2017-05-02

## 2022-03-19 PROBLEM — I26.99 PULMONARY EMBOLI (HCC): Status: ACTIVE | Noted: 2017-03-28

## 2022-03-19 PROBLEM — M17.11 OSTEOARTHRITIS OF RIGHT KNEE: Status: ACTIVE | Noted: 2018-06-19

## 2022-03-19 PROBLEM — I82.409 DEEP VENOUS THROMBOSIS OF LOWER EXTREMITY (HCC): Status: ACTIVE | Noted: 2017-03-28

## 2022-03-19 PROBLEM — R79.89 ELEVATED TROPONIN: Status: ACTIVE | Noted: 2017-03-28

## 2022-03-19 PROBLEM — R77.8 ELEVATED TROPONIN: Status: ACTIVE | Noted: 2017-03-28

## 2022-03-19 PROBLEM — I26.99 ACUTE PULMONARY EMBOLISM (HCC): Status: ACTIVE | Noted: 2017-05-02

## 2022-03-20 PROBLEM — I87.009 POST-PHLEBITIC SYNDROME: Status: ACTIVE | Noted: 2017-05-02

## 2022-03-20 PROBLEM — M17.10 ARTHRITIS OF KNEE: Status: ACTIVE | Noted: 2018-06-19

## 2022-03-20 PROBLEM — I82.532 CHRONIC DEEP VEIN THROMBOSIS (DVT) OF POPLITEAL VEIN OF LEFT LOWER EXTREMITY (HCC): Status: ACTIVE | Noted: 2017-06-30

## 2022-05-04 NOTE — PROGRESS NOTES
Romayne Saltness (: 1965) is a 61 y.o. male, patient, here for evaluation of the following chief complaint(s):  Elbow Pain (right)       HPI:    He was last seen for his right elbow pain on 2021 by Dr. Grayson Dougherty. He describes his right elbow pain as severe, stabbing, throbbing, and intermittent. He states that his pain continues to get worse. He reports that lifting and exercise makes pain worse and rest makes his pain better. He has been taking Tylenol for his discomfort as needed. The patient has been wearing a brace for comfort, stability, and support. Of note, the patient did have his right elbow injected with cortisone at his last visit. No Known Allergies    Current Outpatient Medications   Medication Sig    Pennsaid 2 % sopk 2 Pump by Apply Externally route two (2) times a day.  rivaroxaban (Xarelto) 20 mg tab tablet Take 1 Tablet by mouth daily. Starting 1/3/19  Indications: blood clot in a deep vein of the extremities    OTHER CBD oil     No current facility-administered medications for this visit. Past Medical History:   Diagnosis Date    Arthritis     KNEE    Hemorrhoids     Nausea & vomiting     Pulmonary embolism ([address]     Thromboembolus ([facility] [address] 2017    LT.  LEG        Past Surgical History:   Procedure Laterality Date    HX HERNIA REPAIR Right     INGUINAL    HX ORTHOPAEDIC Right     MENISCUS REPAIR 3X    HX ROTATOR CUFF REPAIR Left        Family History   Problem Relation Age of Onset    No Known Problems Mother     Cancer Father         BLADDER    No Known Problems Brother     No Known Problems Brother     Anesth Problems Neg Hx         Social History     Socioeconomic History    Marital status: SINGLE     Spouse name: Not on file    Number of children: Not on file    Years of education: Not on file    Highest education level: Not on file   Occupational History    Not on file   Tobacco Use    Smoking status: Never Smoker    Smokeless tobacco: Never Used Substance and Sexual Activity    Alcohol use: Yes     Alcohol/week: 4.0 standard drinks     Types: 4 Cans of beer per week    Drug use: No    Sexual activity: Not on file   Other Topics Concern    Not on file   Social History Narrative    Not on file     Social Determinants of Health     Financial Resource Strain:     Difficulty of Paying Living Expenses: Not on file   Food Insecurity:     Worried About Running Out of Food in the Last Year: Not on file    [place] of Food in the Last Year: Not on file   Transportation Needs:     Lack of Transportation (Medical): Not on file    Lack of Transportation (Non-Medical): Not on file   Physical Activity:     Days of Exercise per Week: Not on file    Minutes of Exercise per Session: Not on file   Stress:     Feeling of Stress : Not on file   Social Connections:     Frequency of Communication with Friends and Family: Not on file    Frequency of Social Gatherings with Friends and Family: Not on file    Attends Hinduism Services: Not on file    Active Member of [address] [place] [address] or Organizations: Not on file    Attends Club or Organization Meetings: Not on file    Marital Status: Not on file   Intimate Partner Violence:     Fear of Current or Ex-Partner: Not on file    Emotionally Abused: Not on file    Physically Abused: Not on file    Sexually Abused: Not on file   Housing Stability:     Unable to Pay for Housing in the Last Year: Not on file    Number of [place] in the Last Year: Not on file    Unstable Housing in the Last Year: Not on file       Review of Systems   All other systems reviewed and are negative. Vitals:  Ht 6' 1\" (1.854 m)   Wt 220 lb (99.8 kg)   BMI 29.03 kg/m²    Body mass index is 29.03 kg/m². Ortho Exam     The patient is well-developed and well-nourished. The patient presents today in alert and oriented x3 with a normal mood and affect.   The patient stands with a normal weightbearing line and walks with a normal gait.    Right elbow is tender to palpation over the lateral epicondyle and extensor origin. The patient has full range of motion, but discomfort with resisted wrist extension and gripping. They have 5/5 strength, and are neurovascularly intact distally. There is no erythema, warmth or skin lesions present. ASSESSMENT/PLAN:      1. Lateral epicondylitis of right elbow  -     XR ELBOW RT MIN 3 V; Future  -     Pennsaid 2 % sopk; 2 Pump by Apply Externally route two (2) times a day., Normal, Disp-112 g, R-3, DAYANA  -     bupivacaine HCl (MARCAINE) 0.25 % (2.5 mg/mL) injection 2.5 mg; 2.5 mg (1 mL), Other, ONCE, 1 dose, On u 5/5/22 at 1000  -     triamcinolone acetonide (KENALOG-40) 40 mg/mL injection 40 mg; 40 mg, Intra artICUlar, ONCE, 1 dose, On Thu 5/5/22 at 1000  2. Right elbow pain  -     Pennsaid 2 % sopk; 2 Pump by Apply Externally route two (2) times a day., Normal, Disp-112 g, R-3, DAYANA     XR Results (most recent):  Results from Appointment encounter on 05/05/22    XR ELBOW RT MIN 3 V    Narrative  Right elbow 2 view x-rays show no evidence of a fracture or dislocation. Below is the assessment and plan developed based on review of pertinent history, physical exam, labs, studies, and medications. We discussed the patient's ongoing right elbow pain and his signs, symptoms, physical exam, description of his pain, and past x-rays are consistent with lateral epicondylitis. The possible treatment options were discussed with the patient and we elected to inject his right elbow with cortisone today to try and alleviate some of his discomfort. The risks and benefits of the injection were discussed in detail with the patient and under sterile prep the patient's right elbow was injected with 1 cc of 40 mg/cc of Kenalog and 1 cc of 0.25% Sensorcaine. He tolerated the injection well.   I did encourage him to continue to ice when possible, modify his activity level based on his right elbow pain, and use anti-inflammatory medication when necessary. The patient was given a prescription for topical Pennsaid cream which he will use as directed. The patient will also work on range of motion, strengthening, and stretching exercises with an at-home exercise program as pain tolerates. He will obtain a wrist brace to limit his wrist range of motion and hopefully alleviate his right elbow pain. I will see him back in 4 weeks for reevaluation if he continues to have persistence of his pain however, if his pain is improved and is well-maintained I will see him back on an as-needed basis at which point we discussed alternative treatment options and almost certainly obtain an MRI of his right elbow. Return in about 4 weeks (around 6/2/2022), or if symptoms worsen or fail to improve. An electronic signature was used to authenticate this note.   -- Ina Briones MD

## 2022-05-05 ENCOUNTER — OFFICE VISIT (OUTPATIENT)
Dept: ORTHOPEDIC SURGERY | Age: 57
End: 2022-05-05
Payer: COMMERCIAL

## 2022-05-05 VITALS — WEIGHT: 220 LBS | BODY MASS INDEX: 29.16 KG/M2 | HEIGHT: 73 IN

## 2022-05-05 DIAGNOSIS — M25.521 RIGHT ELBOW PAIN: ICD-10-CM

## 2022-05-05 DIAGNOSIS — M77.11 LATERAL EPICONDYLITIS OF RIGHT ELBOW: Primary | ICD-10-CM

## 2022-05-05 PROCEDURE — 20605 DRAIN/INJ JOINT/BURSA W/O US: CPT | Performed by: ORTHOPAEDIC SURGERY

## 2022-05-05 RX ORDER — TRIAMCINOLONE ACETONIDE 40 MG/ML
40 INJECTION, SUSPENSION INTRA-ARTICULAR; INTRAMUSCULAR ONCE
Status: COMPLETED | OUTPATIENT
Start: 2022-05-05 | End: 2022-05-05

## 2022-05-05 RX ORDER — BUPIVACAINE HYDROCHLORIDE 2.5 MG/ML
1 INJECTION, SOLUTION INFILTRATION; PERINEURAL ONCE
Status: COMPLETED | OUTPATIENT
Start: 2022-05-05 | End: 2022-05-05

## 2022-05-05 RX ORDER — DICLOFENAC SODIUM 20 MG/G
2 SOLUTION TOPICAL 2 TIMES DAILY
Qty: 112 G | Refills: 3 | Status: SHIPPED | OUTPATIENT
Start: 2022-05-05 | End: 2022-08-02 | Stop reason: ALTCHOICE

## 2022-05-05 RX ADMIN — BUPIVACAINE HYDROCHLORIDE 2.5 MG: 2.5 INJECTION, SOLUTION INFILTRATION; PERINEURAL at 09:56

## 2022-05-05 RX ADMIN — TRIAMCINOLONE ACETONIDE 40 MG: 40 INJECTION, SUSPENSION INTRA-ARTICULAR; INTRAMUSCULAR at 09:56

## 2022-05-24 ENCOUNTER — OFFICE VISIT (OUTPATIENT)
Dept: ORTHOPEDIC SURGERY | Age: 57
End: 2022-05-24
Payer: COMMERCIAL

## 2022-05-24 VITALS — HEIGHT: 73 IN | BODY MASS INDEX: 29.16 KG/M2 | WEIGHT: 220 LBS

## 2022-05-24 DIAGNOSIS — M79.641 RIGHT HAND PAIN: ICD-10-CM

## 2022-05-24 DIAGNOSIS — M65.311 TRIGGER FINGER OF RIGHT THUMB: ICD-10-CM

## 2022-05-24 DIAGNOSIS — M77.11 LATERAL EPICONDYLITIS OF RIGHT ELBOW: ICD-10-CM

## 2022-05-24 DIAGNOSIS — M65.312 TRIGGER FINGER OF LEFT THUMB: Primary | ICD-10-CM

## 2022-05-24 PROCEDURE — 20550 NJX 1 TENDON SHEATH/LIGAMENT: CPT | Performed by: ORTHOPAEDIC SURGERY

## 2022-05-24 PROCEDURE — 99213 OFFICE O/P EST LOW 20 MIN: CPT | Performed by: ORTHOPAEDIC SURGERY

## 2022-05-24 RX ORDER — BUPIVACAINE HYDROCHLORIDE 2.5 MG/ML
1 INJECTION, SOLUTION INFILTRATION; PERINEURAL ONCE
Status: COMPLETED | OUTPATIENT
Start: 2022-05-24 | End: 2022-05-24

## 2022-05-24 RX ORDER — BETAMETHASONE SODIUM PHOSPHATE AND BETAMETHASONE ACETATE 3; 3 MG/ML; MG/ML
6 INJECTION, SUSPENSION INTRA-ARTICULAR; INTRALESIONAL; INTRAMUSCULAR; SOFT TISSUE ONCE
Status: COMPLETED | OUTPATIENT
Start: 2022-05-24 | End: 2022-05-24

## 2022-05-24 RX ADMIN — BUPIVACAINE HYDROCHLORIDE 2.5 MG: 2.5 INJECTION, SOLUTION INFILTRATION; PERINEURAL at 16:54

## 2022-05-24 RX ADMIN — BETAMETHASONE SODIUM PHOSPHATE AND BETAMETHASONE ACETATE 6 MG: 3; 3 INJECTION, SUSPENSION INTRA-ARTICULAR; INTRALESIONAL; INTRAMUSCULAR; SOFT TISSUE at 16:54

## 2022-05-24 NOTE — PATIENT INSTRUCTIONS
Trigger Finger: Care Instructions  Overview  A trigger finger is a finger stuck in a bent position. It happens when the tendon that bends and straightens the thumb or finger can't slide smoothly under the ligaments that hold the tendon against the bones. In most cases, it's caused by a bump (nodule) that forms on the tendon. The bent finger usually straightens out on its own. A trigger finger can be painful. But it normally isn't a serious problem. Trigger fingers seem to occur more in some groups of people. These groups include:  · People who have diabetes or arthritis. · People who have injured their hands in the past.  · Musicians. · People who  tools often. Rest and exercises may help your finger relax so that it can bend. You may get a corticosteroid shot. This can reduce swelling and pain. Your doctor may put a splint on your finger. It will give your finger some rest. You may need surgery if the finger keeps locking in a bent position. Follow-up care is a key part of your treatment and safety. Be sure to make and go to all appointments, and call your doctor if you are having problems. It's also a good idea to know your test results and keep a list of the medicines you take. How can you care for yourself at home? · If your doctor put a splint on your finger, wear it as directed. Don't take it off until your doctor says you can. · You may need to change your activities to avoid movements that irritate the finger. · Take your medicines exactly as prescribed. Call your doctor if you think you are having a problem with your medicine. · Ask your doctor if you can take an over-the-counter pain medicine, such as acetaminophen (Tylenol), ibuprofen (Advil, Motrin), or naproxen (Aleve). Be safe with medicines. Read and follow all instructions on the label. · If your doctor recommends exercises, do them as directed. When should you call for help?    Call your doctor now or seek immediate medical care if:    · Your finger locks in a bent position and will not straighten. Watch closely for changes in your health, and be sure to contact your doctor if:    · You do not get better as expected. Where can you learn more? Go to http://rc-nabila.info/  Enter M826 in the search box to learn more about \"Trigger Finger: Care Instructions. \"  Current as of: July 1, 2021               Content Version: 13.2  © 2006-2022 [employer]. Care instructions adapted under license by [employer] (which disclaims liability or warranty for this information). If you have questions about a medical condition or this instruction, always ask your healthcare professional. [address] any warranty or liability for your use of this information.

## 2022-05-24 NOTE — PROGRESS NOTES
HPI: Owen Lora (: 1965) is a 61 y.o. male, patient, here for evaluation of the following chief complaint(s):    Hand Pain (C/o left thumb clicking, locking. H/o right trigger thumb injection 21.)  Patient seen today to evaluate his right arm and hand. He had previously complained since the summer of right elbow lateral epicondylar type pain. He denied any specific fall or other injury. He is a past patient with Dr. Dumont and had a left lateral epicondyle injection performed that helped. He also had a left trigger thumb injection on 2020. He reports that this was actually his second injection. He then complained of catching, triggering locking involving the right thumb and received a right trigger thumb injection on 2021. Overall the right side has done well but he does have occasional moments of recurrent locking. He experiences now more consistent locking and popping sensation in his left thumb. Vitals:  Ht 6' 1\" (1.854 m)   Wt 220 lb (99.8 kg)   BMI 29.03 kg/m²    Body mass index is 29.03 kg/m². No Known Allergies    Current Outpatient Medications   Medication Sig    rivaroxaban (Xarelto) 20 mg tab tablet Take 1 Tablet by mouth daily. Starting 1/3/19  Indications: blood clot in a deep vein of the extremities    Pennsaid 2 % sopk 2 Pump by Apply Externally route two (2) times a day.  OTHER CBD oil     No current facility-administered medications for this visit. Past Medical History:   Diagnosis Date    Arthritis     KNEE    Hemorrhoids     Nausea & vomiting     Pulmonary embolism ([address]     Thromboembolus ([address] 2017    LT.  LEG        Past Surgical History:   Procedure Laterality Date    HX HERNIA REPAIR Right     INGUINAL    HX ORTHOPAEDIC Right     MENISCUS REPAIR 3X    HX ROTATOR CUFF REPAIR Left        Family History   Problem Relation Age of Onset    No Known Problems Mother     Cancer Father         BLADDER    No Known Problems Brother     No Known Problems Brother     Anesth Problems Neg Hx         Social History     Tobacco Use    Smoking status: Never Smoker    Smokeless tobacco: Never Used   Substance Use Topics    Alcohol use: Yes     Alcohol/week: 4.0 standard drinks     Types: 4 Cans of beer per week    Drug use: No        Review of Systems   All other systems reviewed and are negative. ROS     Positive for: Musculoskeletal    Last edited by Jamee Rodriguez on 5/24/2022  4:28 PM. (History)             Physical Exam    Patient is tenderness more profound on the left than the right thumb at the A1 pulley with very active clicking and locking of the left more than the right thumb. No cyst or mass. Otherwise good sensation refill and improved elbow pain in general.    Imaging:    No new x-rays today. ASSESSMENT/PLAN:  Below is the assessment and plan developed based on review of pertinent history, physical exam, labs, studies, and medications. Patient examination was now consistent with left more than right thumb stenosing tenosynovitis as his right elbow lateral condyle-itis has improved. He received a left trigger thumb injection on 5/24/2022 and had previously received an injection in December 2020. He also had received a right trigger thumb injection on 12/30/2021. He admits that in the future he more likely than not would rather undergo bilateral trigger thumb release surgery which I reviewed with him in some detail and answered his questions. Follow-up in 3 to 4 weeks. 1. Trigger finger of left thumb  -     bupivacaine HCl (MARCAINE) 0.25 % (2.5 mg/mL) injection 2.5 mg; 2.5 mg (1 mL), Other, ONCE, 1 dose, On Tue 5/24/22 at 1700  -     INJECT TENDON SHEATH/LIGAMENT  -     betamethasone (CELESTONE) injection 6 mg; 6 mg, Other, ONCE, 1 dose, On Tue 5/24/22 at 1700  2. Trigger finger of right thumb  3. Right hand pain  4.  Lateral epicondylitis of right elbow      Informed consent was obtained and the patient received a left trigger thumb A1 pulley corticosteroid injection with 6 mg of betamethasone mixed with 1 cc of quarter percent bupivacaine. Return in about 4 weeks (around 6/21/2022). An electronic signature was used to authenticate this note.   -- Regan Cotto MD

## 2022-08-02 ENCOUNTER — OFFICE VISIT (OUTPATIENT)
Dept: INTERNAL MEDICINE CLINIC | Age: 57
End: 2022-08-02
Payer: COMMERCIAL

## 2022-08-02 VITALS
SYSTOLIC BLOOD PRESSURE: 152 MMHG | OXYGEN SATURATION: 97 % | WEIGHT: 226 LBS | BODY MASS INDEX: 29.95 KG/M2 | RESPIRATION RATE: 16 BRPM | DIASTOLIC BLOOD PRESSURE: 95 MMHG | HEART RATE: 72 BPM | HEIGHT: 73 IN

## 2022-08-02 DIAGNOSIS — Z11.59 NEED FOR HEPATITIS C SCREENING TEST: ICD-10-CM

## 2022-08-02 DIAGNOSIS — I82.409 ACUTE DEEP VEIN THROMBOSIS (DVT) OF LOWER EXTREMITY, UNSPECIFIED LATERALITY, UNSPECIFIED VEIN (HCC): ICD-10-CM

## 2022-08-02 DIAGNOSIS — Z00.00 VISIT FOR WELL MAN HEALTH CHECK: Primary | ICD-10-CM

## 2022-08-02 DIAGNOSIS — Z12.11 COLON CANCER SCREENING: ICD-10-CM

## 2022-08-02 LAB
ALBUMIN SERPL-MCNC: 4 G/DL (ref 3.5–5)
ALBUMIN/GLOB SERPL: 1.3 {RATIO} (ref 1.1–2.2)
ALP SERPL-CCNC: 58 U/L (ref 45–117)
ALT SERPL-CCNC: 43 U/L (ref 12–78)
ANION GAP SERPL CALC-SCNC: 7 MMOL/L (ref 5–15)
AST SERPL-CCNC: 24 U/L (ref 15–37)
BILIRUB SERPL-MCNC: 0.5 MG/DL (ref 0.2–1)
BUN SERPL-MCNC: 20 MG/DL (ref 6–20)
BUN/CREAT SERPL: 18 (ref 12–20)
CALCIUM SERPL-MCNC: 9.7 MG/DL (ref 8.5–10.1)
CHLORIDE SERPL-SCNC: 106 MMOL/L (ref 97–108)
CHOLEST SERPL-MCNC: 267 MG/DL
CO2 SERPL-SCNC: 27 MMOL/L (ref 21–32)
CREAT SERPL-MCNC: 1.09 MG/DL (ref 0.7–1.3)
ERYTHROCYTE [DISTWIDTH] IN BLOOD BY AUTOMATED COUNT: 12.5 % (ref 11.5–14.5)
GLOBULIN SER CALC-MCNC: 3.2 G/DL (ref 2–4)
GLUCOSE SERPL-MCNC: 95 MG/DL (ref 65–100)
HCT VFR BLD AUTO: 47.5 % (ref 36.6–50.3)
HCV AB SERPL QL IA: NONREACTIVE
HDLC SERPL-MCNC: 67 MG/DL
HDLC SERPL: 4 {RATIO} (ref 0–5)
HGB BLD-MCNC: 15.6 G/DL (ref 12.1–17)
LDLC SERPL CALC-MCNC: 167.4 MG/DL (ref 0–100)
MCH RBC QN AUTO: 31.5 PG (ref 26–34)
MCHC RBC AUTO-ENTMCNC: 32.8 G/DL (ref 30–36.5)
MCV RBC AUTO: 96 FL (ref 80–99)
NRBC # BLD: 0 K/UL (ref 0–0.01)
NRBC BLD-RTO: 0 PER 100 WBC
PLATELET # BLD AUTO: 222 K/UL (ref 150–400)
PMV BLD AUTO: 10.3 FL (ref 8.9–12.9)
POTASSIUM SERPL-SCNC: 4.6 MMOL/L (ref 3.5–5.1)
PROT SERPL-MCNC: 7.2 G/DL (ref 6.4–8.2)
RBC # BLD AUTO: 4.95 M/UL (ref 4.1–5.7)
SODIUM SERPL-SCNC: 140 MMOL/L (ref 136–145)
TRIGL SERPL-MCNC: 163 MG/DL (ref ?–150)
VLDLC SERPL CALC-MCNC: 32.6 MG/DL
WBC # BLD AUTO: 5 K/UL (ref 4.1–11.1)

## 2022-08-02 PROCEDURE — 99386 PREV VISIT NEW AGE 40-64: CPT | Performed by: FAMILY MEDICINE

## 2022-08-02 NOTE — PROGRESS NOTES
Chief Complaint   Patient presents with    Establish Care     he is a 61y.o. year old male who presents for CPE. Complete Physical Exam Questions:    1. Do you follow a low fat diet? yes  2. Are you up to date on your Tdap (<10 years)? Yes  3. Have you ever had a Pneumovax vaccine (>65)? Unknown   PCV13 Unknown   PPSV23 Unknown  4. Have you had Zoster vaccine (>60)? Unknown  5. Have you had the HPV - Gardasil (13- 26)? No  6. Do you follow an exercise program?  yes  7. Do you smoke?  no If > 65 and smoker, have you had a abdominal aortic aneurysm ultrasound screen? No  8. Do you consider yourself overweight?  no  9. Is there a family history of CAD< age 48? No  10. Is there a family history of Cancer? Yes  11. Do you know your Cancer risks? Yes  12. Have you had a colonoscopy? Yes  13. Have you been tested for HIV or other STI's? No HIV today(18-64 y/o)? No   14. Have you had an EKG in the last five years(>50)? No  15. Have you had a PSA test done this year (50-69)? No      Reviewed and agree with Nurse Note and duplicated in this note. Reviewed PmHx, RxHx, FmHx, SocHx, AllgHx and updated and dated in the chart. Family History   Problem Relation Age of Onset    No Known Problems Mother     Cancer Father         BLADDER    No Known Problems Brother     No Known Problems Brother     Anesth Problems Neg Hx        Past Medical History:   Diagnosis Date    Arthritis     KNEE    Hemorrhoids     Nausea & vomiting     Pulmonary embolism ([facility] [address]     Thromboembolus ([facility] [address] 03/2017    LT. LEG      Social History     Socioeconomic History    Marital status: SINGLE   Tobacco Use    Smoking status: Never    Smokeless tobacco: Never   Substance and Sexual Activity    Alcohol use:  Yes     Alcohol/week: 4.0 standard drinks     Types: 4 Cans of beer per week    Drug use: No     Social Determinants of Health     Physical Activity: Sufficiently Active    Days of Exercise per Week: 5 days    Minutes of Exercise per Session: 90 min        Review of Systems - negative except as listed above      Objective:     Vitals:    08/02/22 1113 08/02/22 1118   BP: (!) 141/88 (!) 152/95   Pulse: 72    Resp: 16    SpO2: 97%    Weight: 226 lb (102.5 kg)    Height: 6' 1\" (1.854 m)        Physical Examination: General appearance - alert, well appearing, and in no distress  Eyes - pupils equal and reactive, extraocular eye movements intact  Ears - bilateral TM's and external ear canals normal  Nose - normal and patent, no erythema, discharge or polyps  Mouth - mucous membranes moist, pharynx normal without lesions  Neck - supple, no significant adenopathy  Chest - clear to auscultation, no wheezes, rales or rhonchi, symmetric air entry  Heart - normal rate, regular rhythm, normal S1, S2, no murmurs, rubs, clicks or gallops  Abdomen - soft, nontender, nondistended, no masses or organomegaly  Back exam - full range of motion, no tenderness, palpable spasm or pain on motion  Neurological - alert, oriented, normal speech, no focal findings or movement disorder noted  Musculoskeletal - no joint tenderness, deformity or swelling  Extremities - peripheral pulses normal, no pedal edema, no clubbing or cyanosis  Skin - normal coloration and turgor, no rashes, no suspicious skin lesions noted       Assessment/ Plan:   Diagnoses and all orders for this visit:    1. Visit for well Jerome health check  -     CBC W/O DIFF; Future  -     LIPID PANEL; Future  -     METABOLIC PANEL, COMPREHENSIVE; Future  -     PSA W/ REFLX FREE PSA; Future    2. Acute deep vein thrombosis (DVT) of lower extremity, unspecified laterality, unspecified vein (HCC)  -     rivaroxaban (Xarelto) 20 mg tab tablet; Take 1 Tablet by mouth in the morning. Starting 1/3/19  Indications: blood clot in a deep vein of the extremities    3. Need for hepatitis C screening test  -     HEPATITIS C AB; Future    4.  Colon cancer screening  -     COLOGUARD TEST (FECAL DNA COLORECTAL CANCER SCREENING)           Labs to be drawn: CBC, CMP, Lipid    I have reviewed/discussed the above normal BMI with the patient. I have recommended the following interventions: dietary management education, guidance, and counseling . I have discussed the diagnosis with the patient and the intended plan as seen in the above orders. The patient has received an after-visit summary and questions were answered concerning future plans. Medication Side Effects and Warnings were discussed with patient,  Patient Labs were reviewed and or requested, and  Patient Past Records were reviewed and or requested  yes         Pt agrees to call or return to clinic and/or go to closest ER with any worsening of symptoms. This may include, but not limited to increased fever (>100.4) with NSAIDS or Tylenol, increased edema, confusion, rash, worsening of presenting symptoms. Please note that this dictation was completed with [employer], the computer voice recognition software. Quite often unanticipated grammatical, syntax, homophones, and other interpretive errors are inadvertently transcribed by the computer software. Please disregard these errors. Please excuse any errors that have escaped final proofreading. Thank you.

## 2022-08-02 NOTE — PROGRESS NOTES
Identified pt with two pt identifiers(name and ). Reviewed record in preparation for visit and have obtained necessary documentation.   Chief Complaint   Patient presents with    [facility] Care        Health Maintenance Due   Topic    Hepatitis C Screening     DTaP/Tdap/Td series (1 - Tdap)    Lipid Screen     Colorectal Cancer Screening Combo     Shingrix Vaccine Age 50> (1 of 2)    COVID-19 Vaccine (2 - Booster for [employer] series)    Depression Screen       Visit Vitals  BP (!) 152/95 (BP 1 Location: Right arm, BP Patient Position: Sitting, BP Cuff Size: Large adult)   Pulse 72   Resp 16   Ht 6' 1\" (1.854 m)   Wt 226 lb (102.5 kg)   SpO2 97%   BMI 29.82 kg/m²     Pain Scale: 2/10

## 2022-08-03 LAB
PSA SERPL-MCNC: 1.4 NG/ML (ref 0–4)
REFLEX CRITERIA: NORMAL

## 2022-08-03 NOTE — PROGRESS NOTES
Your \"Bad\" cholesterol (LDL and/or triglycerides) are elevated. Please eat a healthier diet as described below. In particular avoid fried, fatty and junk foods, while increasing fiber (fruits and vegetables). If you cannot increase fiber through diet, you can supplement with metamucil as directed on bottle daily. Also, make sure you are taking 1 to 2 grams of over the counter fish oil. Increase exercise to 5 times per week of cardio lasting at least 30 min's each (biking, walking, elliptical, swimming). Lets recheck the fasting (atleast eight hours) in 6 months. Mediterranean diet: Choose this heart-healthy diet option  The Mediterranean diet is a heart-healthy eating plan combining elements of Mediterranean-style cooking. Here's how to adopt the Mediterranean diet. If you're looking for a heart-healthy eating plan, the Mediterranean diet might be right for you. The Mediterranean diet incorporates the basics of healthy eating - plus a splash of flavorful olive oil and perhaps a glass of red wine - among other components characterizing the traditional cooking style of countries bordering the [facility]. Most healthy diets include fruits, vegetables, fish and whole grains, and limit unhealthy fats. While these parts of a healthy diet remain tried-and-true, subtle variations or differences in proportions of certain foods may make a difference in your risk of heart disease. Benefits of the [address] has shown that the traditional Mediterranean diet reduces the risk of heart disease. In fact, a recent analysis of more than 1.5 million healthy adults demonstrated that following a Mediterranean diet was associated with a reduced risk of overall and cardiovascular mortality, a reduced incidence of cancer and cancer mortality, and a reduced incidence of Parkinson's and Alzheimer's diseases.    For this reason, most if not all major scientific organizations encourage healthy adults to adapt a style of eating like that of the [address] [facility] for prevention of major chronic diseases. Key components of the Mediterranean diet  The Mediterranean diet emphasizes:   Getting plenty of exercise   Eating primarily plant-based foods, such as fruits and vegetables, whole grains, legumes and nuts   Replacing butter with healthy fats such as olive oil and canola oil   Using herbs and spices instead of salt to flavor foods   Limiting red meat to no more than a few times a month   Eating fish and poultry at least twice a week   Drinking red wine in moderation (optional)   The diet also recognizes the importance of enjoying meals with family and friends. Fruits, vegetables, nuts and grains  The Mediterranean diet traditionally includes fruits, vegetables, pasta and rice. For example, residents of [facility] eat very little red meat and average nine servings a day of antioxidant-rich fruits and vegetables. The Mediterranean diet has been associated with a lower level of oxidized low-density lipoprotein (LDL) cholesterol - the \"bad\" cholesterol that's more likely to build up deposits in your arteries. Nuts are another part of a healthy Mediterranean diet. Nuts are high in fat (approximately 80 percent of their calories come from fat), but most of the fat is not saturated. Because nuts are high in calories, they should not be eaten in large amounts - generally no more than a handful a day. For the best nutrition, avoid candied or honey-roasted and heavily salted nuts. Grains in the [address] [place] [address] region are typically whole grain and usually contain very few unhealthy trans fats, and bread is an important part of the diet there. However, throughout the [address] [place] [address] region, bread is eaten plain or dipped in olive oil - not eaten with butter or margarines, which contain saturated or trans fats.

## 2022-10-27 NOTE — PROGRESS NOTES
Bedside shift change report given to Cadence Courtney (oncoming nurse) by Deni Barrett (offgoing nurse). Report included the following information SBAR, Kardex, Intake/Output, MAR and Recent Results. no

## 2022-12-29 NOTE — PROGRESS NOTES
HPI: Jennie Vega (: 1965) is a 61 y.o. male, patient, here for evaluation of the following chief complaint(s):    Thumb Pain (Pt complains of trigger fingers on both thumbs. He wants to discuss surgery.)  Patient seen today to evaluate his right arm and hand. He had previously complained since the summer of right elbow lateral epicondylar type pain. He denied any specific fall or other injury. He is a past patient with Dr. Augusto Brown and had a left lateral epicondyle injection performed that helped. He also had a left trigger thumb injection on 2020. He reports that this was actually his second injection. He then complained of catching, triggering locking involving the right thumb and received a right trigger thumb injection on 2021. Overall the right side has done well but he does have occasional moments of recurrent locking. He next received a left trigger thumb injection on 2022. While injection therapy is helped he still complains of painful triggering locking involving both his thumbs. Vitals:  Ht 6' 1\" (1.854 m)   Wt 226 lb (102.5 kg)   BMI 29.82 kg/m²    Body mass index is 29.82 kg/m². No Known Allergies    Current Outpatient Medications   Medication Sig    methylPREDNISolone (MEDROL DOSEPACK) 4 mg tablet Per dose pack instructions    rivaroxaban (Xarelto) 20 mg tab tablet Take 1 Tablet by mouth in the morning. Starting 1/3/19  Indications: blood clot in a deep vein of the extremities    OTHER CBD oil     No current facility-administered medications for this visit. Past Medical History:   Diagnosis Date    Arthritis     KNEE    Hemorrhoids     Nausea & vomiting     Pulmonary embolism ([address]     Thromboembolus ([address] 2017    LT.  LEG        Past Surgical History:   Procedure Laterality Date    HX HERNIA REPAIR Right     INGUINAL    HX ORTHOPAEDIC Right     MENISCUS REPAIR 3X    HX ROTATOR CUFF REPAIR Left        Family History   Problem Relation Age of Onset    No Known Problems Mother     Cancer Father         BLADDER    No Known Problems Brother     No Known Problems Brother     Anesth Problems Neg Hx         Social History     Tobacco Use    Smoking status: Never    Smokeless tobacco: Never   Substance Use Topics    Alcohol use: Yes     Alcohol/week: 4.0 standard drinks     Types: 4 Cans of beer per week    Drug use: No        Review of Systems   All other systems reviewed and are negative. Physical Exam    Patient has tenderness more profound on the left than the right thumb at the A1 pulley with very active clicking and locking of the left more than the right thumb. No cyst or mass. Otherwise good sensation refill and improved elbow pain in general.    Imaging:    No new x-rays today. ASSESSMENT/PLAN:  Below is the assessment and plan developed based on review of pertinent history, physical exam, labs, studies, and medications. Patient examination was now consistent with left more than right thumb stenosing tenosynovitis as his right elbow lateral condyle-itis has improved. He received a left trigger thumb injection on 5/24/2022 and had previously received an injection in December 2020. He also had received a right trigger thumb injection on 12/30/2021. He admits that in the future he more likely than not would rather undergo bilateral trigger thumb release surgery which I reviewed with him in some detail and answered his questions. I reviewed risks that include but not limited to stiffness, pain, nerve or tendon damage and overall incomplete relief of pain. He plans to trial a Medrol Dosepak but otherwise arrangements will be made for you to undergo bilateral trigger thumb release surgery on an outpatient basis at his convenience. .    1. Trigger finger of left thumb  -     methylPREDNISolone (MEDROL DOSEPACK) 4 mg tablet; Per dose pack instructions, Normal, Disp-1 Dose Pack, R-0  2. Trigger finger of right thumb  3. Right hand pain  4. Lateral epicondylitis of right elbow  5. Right elbow pain      Return for Follow-up 7 to 10 days after surgery. .    An electronic signature was used to authenticate this note.   -- Malachi Francis MD

## 2023-01-03 ENCOUNTER — OFFICE VISIT (OUTPATIENT)
Dept: ORTHOPEDIC SURGERY | Age: 58
End: 2023-01-03
Payer: COMMERCIAL

## 2023-01-03 VITALS — WEIGHT: 226 LBS | HEIGHT: 73 IN | BODY MASS INDEX: 29.95 KG/M2

## 2023-01-03 DIAGNOSIS — M25.521 RIGHT ELBOW PAIN: ICD-10-CM

## 2023-01-03 DIAGNOSIS — M65.312 TRIGGER FINGER OF LEFT THUMB: Primary | ICD-10-CM

## 2023-01-03 DIAGNOSIS — M77.11 LATERAL EPICONDYLITIS OF RIGHT ELBOW: ICD-10-CM

## 2023-01-03 DIAGNOSIS — M65.311 TRIGGER FINGER OF RIGHT THUMB: ICD-10-CM

## 2023-01-03 DIAGNOSIS — M79.641 RIGHT HAND PAIN: ICD-10-CM

## 2023-01-03 PROCEDURE — 99213 OFFICE O/P EST LOW 20 MIN: CPT | Performed by: ORTHOPAEDIC SURGERY

## 2023-01-03 RX ORDER — METHYLPREDNISOLONE 4 MG/1
TABLET ORAL
Qty: 1 DOSE PACK | Refills: 0 | Status: SHIPPED | OUTPATIENT
Start: 2023-01-03

## 2023-01-03 NOTE — PATIENT INSTRUCTIONS
Trigger Finger: Care Instructions  Overview  A trigger finger is a finger stuck in a bent position. It happens when the tendon that bends and straightens the thumb or finger can't slide smoothly under the ligaments that hold the tendon against the bones. In most cases, it's caused by a bump (nodule) that forms on the tendon. The bent finger usually straightens out on its own. A trigger finger can be painful. But it normally isn't a serious problem. Trigger fingers seem to occur more in some groups of people. These groups include:  People who have diabetes or arthritis. People who have injured their hands in the past.  Musicians. People who  tools often. Rest and exercises may help your finger relax so that it can bend. You may get a corticosteroid shot. This can reduce swelling and pain. Your doctor may put a splint on your finger. It will give your finger some rest. You may need surgery if the finger keeps locking in a bent position. Follow-up care is a key part of your treatment and safety. Be sure to make and go to all appointments, and call your doctor if you are having problems. It's also a good idea to know your test results and keep a list of the medicines you take. How can you care for yourself at home? If your doctor put a splint on your finger, wear it as directed. Don't take it off until your doctor says you can. You may need to change your activities to avoid movements that irritate the finger. Take your medicines exactly as prescribed. Call your doctor if you think you are having a problem with your medicine. Ask your doctor if you can take an over-the-counter pain medicine, such as acetaminophen (Tylenol), ibuprofen (Advil, Motrin), or naproxen (Aleve). Be safe with medicines. Read and follow all instructions on the label. If your doctor recommends exercises, do them as directed. When should you call for help?    Call your doctor now or seek immediate medical care if:    Your finger locks in a bent position and will not straighten. Watch closely for changes in your health, and be sure to contact your doctor if:    You do not get better as expected. Where can you learn more? Go to http://www.mcdonough.com/  Enter M826 in the search box to learn more about \"Trigger Finger: Care Instructions. \"  Current as of: March 9, 2022               Content Version: 13.4  © 2006-2022 [employer]. Care instructions adapted under license by [employer] (which disclaims liability or warranty for this information). If you have questions about a medical condition or this instruction, always ask your healthcare professional. [address] any warranty or liability for your use of this information.

## 2023-01-04 DIAGNOSIS — M65.311 TRIGGER FINGER OF RIGHT THUMB: ICD-10-CM

## 2023-01-04 DIAGNOSIS — M65.312 TRIGGER FINGER OF LEFT THUMB: Primary | ICD-10-CM

## 2023-02-02 ENCOUNTER — TELEPHONE (OUTPATIENT)
Dept: ORTHOPEDIC SURGERY | Age: 58
End: 2023-02-02

## 2023-02-02 DIAGNOSIS — M65.312 TRIGGER FINGER OF LEFT THUMB: Primary | ICD-10-CM

## 2023-02-02 DIAGNOSIS — M65.311 TRIGGER FINGER OF RIGHT THUMB: ICD-10-CM

## 2023-02-02 RX ORDER — HYDROCODONE BITARTRATE AND ACETAMINOPHEN 5; 325 MG/1; MG/1
1 TABLET ORAL
Qty: 15 TABLET | Refills: 0 | Status: SHIPPED | OUTPATIENT
Start: 2023-02-02 | End: 2023-02-09

## 2023-02-09 NOTE — PROGRESS NOTES
HPI: Kash Interiano (: 1965) is a 61 y.o. male, patient, here for evaluation of the following chief complaint(s):    Surgical Follow-up (Bilateral trigger thumb releases on 2/3/23.)  Patient seen today to evaluate his right arm and hand. He had previously complained since the summer of right elbow lateral epicondylar type pain. He denied any specific fall or other injury. He is a past patient with Dr. Joseph Estrada and had a left lateral epicondyle injection performed that helped. He also had a left trigger thumb injection on 2020. He reports that this was actually his second injection. He then complained of catching, triggering locking involving the right thumb and received a right trigger thumb injection on 2021. Overall the right side has done well but he does have occasional moments of recurrent locking. He next received a left trigger thumb injection on 2022. While injection therapy is helped he still complains of painful triggering locking involving both his thumbs. He elected to undergo bilateral trigger thumb release surgery on 2/3/2023. Vitals:  Ht 6' 1\" (1.854 m)   Wt 226 lb (102.5 kg)   BMI 29.82 kg/m²    Body mass index is 29.82 kg/m². No Known Allergies    Current Outpatient Medications   Medication Sig    methylPREDNISolone (MEDROL DOSEPACK) 4 mg tablet Per dose pack instructions    rivaroxaban (Xarelto) 20 mg tab tablet Take 1 Tablet by mouth in the morning. Starting 1/3/19  Indications: blood clot in a deep vein of the extremities    OTHER CBD oil     No current facility-administered medications for this visit. Past Medical History:   Diagnosis Date    Arthritis     KNEE    Hemorrhoids     Nausea & vomiting     Pulmonary embolism ([address]     Thromboembolus ([address] 2017    LT.  LEG        Past Surgical History:   Procedure Laterality Date    HX HERNIA REPAIR Right     INGUINAL    HX ORTHOPAEDIC Right     MENISCUS REPAIR 3X    HX ROTATOR CUFF REPAIR Left Family History   Problem Relation Age of Onset    No Known Problems Mother     Cancer Father         BLADDER    No Known Problems Brother     No Known Problems Brother     Anesth Problems Neg Hx         Social History     Tobacco Use    Smoking status: Never    Smokeless tobacco: Never   Substance Use Topics    Alcohol use: Yes     Alcohol/week: 4.0 standard drinks     Types: 4 Cans of beer per week    Drug use: No        Review of Systems   All other systems reviewed and are negative. Physical Exam    Overall the thumb wounds are healing well. No redness drainage or sign infection. Motion is improving. Imaging:    No new x-rays today. ASSESSMENT/PLAN:  Below is the assessment and plan developed based on review of pertinent history, physical exam, labs, studies, and medications. Patient examination was now consistent with left more than right thumb stenosing tenosynovitis as his right elbow lateral condyle-itis has improved. He received a left trigger thumb injection on 5/24/2022 and had previously received an injection in December 2020. He also had received a right trigger thumb injection on 12/30/2021. He admits that in the future he more likely than not would rather undergo bilateral trigger thumb release surgery which I reviewed with him in some detail and answered his questions. He underwent bilateral trigger thumb surgery on 2/3/2023. Overall he has good range of motion and no further walking. Continue with wound care, gentle motion and strength and return 3 to 4 weeks. 1. Trigger finger of left thumb  2. Trigger finger of right thumb  3. Status post trigger finger release  4. Lateral epicondylitis of right elbow  5. Right elbow pain      Return in about 4 weeks (around 3/10/2023). An electronic signature was used to authenticate this note.   -- Russ Butler MD

## 2023-02-10 ENCOUNTER — OFFICE VISIT (OUTPATIENT)
Dept: ORTHOPEDIC SURGERY | Age: 58
End: 2023-02-10
Payer: COMMERCIAL

## 2023-02-10 VITALS — WEIGHT: 226 LBS | HEIGHT: 73 IN | BODY MASS INDEX: 29.95 KG/M2

## 2023-02-10 DIAGNOSIS — M65.312 TRIGGER FINGER OF LEFT THUMB: Primary | ICD-10-CM

## 2023-02-10 DIAGNOSIS — M65.311 TRIGGER FINGER OF RIGHT THUMB: ICD-10-CM

## 2023-02-10 DIAGNOSIS — M77.11 LATERAL EPICONDYLITIS OF RIGHT ELBOW: ICD-10-CM

## 2023-02-10 DIAGNOSIS — M25.521 RIGHT ELBOW PAIN: ICD-10-CM

## 2023-02-10 DIAGNOSIS — Z98.890 STATUS POST TRIGGER FINGER RELEASE: ICD-10-CM

## 2023-02-10 PROCEDURE — 99024 POSTOP FOLLOW-UP VISIT: CPT | Performed by: ORTHOPAEDIC SURGERY

## 2023-03-28 ENCOUNTER — HOSPITAL ENCOUNTER (EMERGENCY)
Age: 58
Discharge: HOME | End: 2023-03-28
Attending: EMERGENCY MEDICINE
Payer: COMMERCIAL

## 2023-03-28 ENCOUNTER — APPOINTMENT (OUTPATIENT)
Dept: VASCULAR SURGERY | Age: 58
End: 2023-03-28
Attending: EMERGENCY MEDICINE
Payer: COMMERCIAL

## 2023-03-28 VITALS
RESPIRATION RATE: 16 BRPM | DIASTOLIC BLOOD PRESSURE: 86 MMHG | SYSTOLIC BLOOD PRESSURE: 155 MMHG | HEART RATE: 86 BPM | OXYGEN SATURATION: 98 % | TEMPERATURE: 97 F

## 2023-03-28 DIAGNOSIS — M79.662 PAIN OF LEFT CALF: Primary | ICD-10-CM

## 2023-03-28 DIAGNOSIS — Z79.01 ANTICOAGULATED: ICD-10-CM

## 2023-03-28 PROCEDURE — 99284 EMERGENCY DEPT VISIT MOD MDM: CPT

## 2023-03-28 PROCEDURE — 93971 EXTREMITY STUDY: CPT

## 2023-03-28 NOTE — ED TRIAGE NOTES
Pt thinks he has another DVT to left calf, denies swelling or injury, denies cp or sob, recent plane ride, pt on blood thinners at this time

## 2023-03-28 NOTE — ED PROVIDER NOTES
59-year-old male presents from home with complaint of left calf pain. He noticed the calf pain starting 3 days ago. He had just flown [place] the country. 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 Denies any chest pain, shortness of breath, swelling. No other complaints at this time       Past Medical History:   Diagnosis Date    Arthritis     KNEE    Hemorrhoids     Nausea & vomiting     Pulmonary embolism ([facility] [address]     Thromboembolus ([facility] [address] 03/2017    LT. LEG       Past Surgical History:   Procedure Laterality Date    HX HERNIA REPAIR Right     INGUINAL    HX ORTHOPAEDIC Right     MENISCUS REPAIR 3X    HX ROTATOR CUFF REPAIR Left          Family History:   Problem Relation Age of Onset    No Known Problems Mother     Cancer Father         BLADDER    No Known Problems Brother     No Known Problems Brother     Anesth Problems Neg Hx        Social History     Socioeconomic History    Marital status: SINGLE     Spouse name: Not on file    Number of children: Not on file    Years of education: Not on file    Highest education level: Not on file   Occupational History    Not on file   Tobacco Use    Smoking status: Never    Smokeless tobacco: Never   Substance and Sexual Activity    Alcohol use:  Yes     Alcohol/week: 4.0 standard drinks     Types: 4 Cans of beer per week    Drug use: No    Sexual activity: Not on file   Other Topics Concern    Not on file   Social History Narrative    Not on file     Social Determinants of Health     Financial Resource Strain: Not on file   Food Insecurity: Not on file   Transportation Needs: Not on file   Physical Activity: Sufficiently Active    Days of Exercise per Week: 5 days    Minutes of Exercise per Session: 90 min   Stress: Not on file   Social Connections: Not on file   Intimate Partner Violence: Not At Risk    Fear of Current or Ex-Partner: No    Emotionally Abused: No    Physically Abused: No    Sexually Abused: No   Housing Stability: Not on file         ALLERGIES: Patient has no known allergies. Review of Systems   Constitutional:  Negative for diaphoresis and fever. HENT:  Negative for facial swelling. Eyes:  Negative for visual disturbance. Respiratory:  Negative for cough. Cardiovascular:  Negative for chest pain. Gastrointestinal:  Negative for abdominal pain. Genitourinary:  Negative for dysuria. Musculoskeletal:  Negative for joint swelling. Skin:  Negative for rash. Neurological:  Negative for headaches. Hematological:  Negative for adenopathy. Psychiatric/Behavioral:  Negative for suicidal ideas. Vitals:    03/28/23 0801   BP: (!) 155/86   Pulse: 86   Resp: 16   Temp: 97 °F (36.1 °C)   SpO2: 98%            Physical Exam  Vitals and nursing note reviewed. Constitutional:       General: He is not in acute distress. Appearance: He is well-developed. He is not ill-appearing. HENT:      Head: Normocephalic and atraumatic. Eyes:      Pupils: Pupils are equal, round, and reactive to light. Cardiovascular:      Rate and Rhythm: Normal rate. Pulmonary:      Effort: Pulmonary effort is normal. No respiratory distress. Abdominal:      General: There is no distension. Musculoskeletal:         General: Normal range of motion. Cervical back: Normal range of motion and neck supple. Skin:     General: Skin is warm and dry. Neurological:      Mental Status: He is alert and oriented to person, place, and time. Medical Decision Making  Assessment: Patient with left calf soreness over the past few days after a long flight. No evidence of DVT on ultrasound. Vital signs normal.  Stable for discharge home to treat symptomatically. Amount and/or Complexity of Data Reviewed  ECG/medicine tests: ordered.            Procedures

## 2023-05-19 RX ORDER — METHYLPREDNISOLONE 4 MG/1
TABLET ORAL
COMMUNITY
Start: 2023-01-03

## 2023-09-13 ENCOUNTER — OFFICE VISIT (OUTPATIENT)
Facility: CLINIC | Age: 58
End: 2023-09-13
Payer: COMMERCIAL

## 2023-09-13 VITALS
WEIGHT: 234 LBS | HEIGHT: 73 IN | BODY MASS INDEX: 31.01 KG/M2 | OXYGEN SATURATION: 95 % | RESPIRATION RATE: 16 BRPM | SYSTOLIC BLOOD PRESSURE: 138 MMHG | TEMPERATURE: 98.3 F | DIASTOLIC BLOOD PRESSURE: 90 MMHG | HEART RATE: 75 BPM

## 2023-09-13 DIAGNOSIS — Z00.00 VISIT FOR WELL MAN HEALTH CHECK: Primary | ICD-10-CM

## 2023-09-13 DIAGNOSIS — Z12.11 COLON CANCER SCREENING: ICD-10-CM

## 2023-09-13 DIAGNOSIS — Z11.59 NEED FOR HEPATITIS C SCREENING TEST: ICD-10-CM

## 2023-09-13 DIAGNOSIS — Z23 NEED FOR VACCINATION: ICD-10-CM

## 2023-09-13 PROCEDURE — 90471 IMMUNIZATION ADMIN: CPT | Performed by: FAMILY MEDICINE

## 2023-09-13 PROCEDURE — 90472 IMMUNIZATION ADMIN EACH ADD: CPT | Performed by: FAMILY MEDICINE

## 2023-09-13 PROCEDURE — 90750 HZV VACC RECOMBINANT IM: CPT | Performed by: FAMILY MEDICINE

## 2023-09-13 PROCEDURE — 90732 PPSV23 VACC 2 YRS+ SUBQ/IM: CPT | Performed by: FAMILY MEDICINE

## 2023-09-13 PROCEDURE — 99396 PREV VISIT EST AGE 40-64: CPT | Performed by: FAMILY MEDICINE

## 2023-09-13 ASSESSMENT — PATIENT HEALTH QUESTIONNAIRE - PHQ9
SUM OF ALL RESPONSES TO PHQ QUESTIONS 1-9: 0
2. FEELING DOWN, DEPRESSED OR HOPELESS: 0
SUM OF ALL RESPONSES TO PHQ QUESTIONS 1-9: 0
SUM OF ALL RESPONSES TO PHQ9 QUESTIONS 1 & 2: 0
1. LITTLE INTEREST OR PLEASURE IN DOING THINGS: 0
2. FEELING DOWN, DEPRESSED OR HOPELESS: 0
1. LITTLE INTEREST OR PLEASURE IN DOING THINGS: 0
SUM OF ALL RESPONSES TO PHQ9 QUESTIONS 1 & 2: 0
SUM OF ALL RESPONSES TO PHQ QUESTIONS 1-9: 0

## 2023-09-14 LAB
ALBUMIN SERPL-MCNC: 3.9 G/DL (ref 3.5–5)
ALBUMIN/GLOB SERPL: 1.3 (ref 1.1–2.2)
ALP SERPL-CCNC: 62 U/L (ref 45–117)
ALT SERPL-CCNC: 36 U/L (ref 12–78)
ANION GAP SERPL CALC-SCNC: 5 MMOL/L (ref 5–15)
AST SERPL-CCNC: 20 U/L (ref 15–37)
BASOPHILS # BLD: 0 K/UL (ref 0–0.1)
BASOPHILS NFR BLD: 1 % (ref 0–1)
BILIRUB SERPL-MCNC: 0.3 MG/DL (ref 0.2–1)
BUN SERPL-MCNC: 17 MG/DL (ref 6–20)
BUN/CREAT SERPL: 13 (ref 12–20)
CALCIUM SERPL-MCNC: 9 MG/DL (ref 8.5–10.1)
CHLORIDE SERPL-SCNC: 106 MMOL/L (ref 97–108)
CHOLEST SERPL-MCNC: 254 MG/DL
CO2 SERPL-SCNC: 27 MMOL/L (ref 21–32)
CREAT SERPL-MCNC: 1.29 MG/DL (ref 0.7–1.3)
DIFFERENTIAL METHOD BLD: NORMAL
EOSINOPHIL # BLD: 0.1 K/UL (ref 0–0.4)
EOSINOPHIL NFR BLD: 2 % (ref 0–7)
ERYTHROCYTE [DISTWIDTH] IN BLOOD BY AUTOMATED COUNT: 12.6 % (ref 11.5–14.5)
GLOBULIN SER CALC-MCNC: 3.1 G/DL (ref 2–4)
GLUCOSE SERPL-MCNC: 93 MG/DL (ref 65–100)
HCT VFR BLD AUTO: 45.9 % (ref 36.6–50.3)
HCV AB SER IA-ACNC: 0.1 INDEX
HCV AB SERPL QL IA: NONREACTIVE
HDLC SERPL-MCNC: 54 MG/DL
HDLC SERPL: 4.7 (ref 0–5)
HGB BLD-MCNC: 14.9 G/DL (ref 12.1–17)
HIV 1+2 AB+HIV1 P24 AG SERPL QL IA: NONREACTIVE
HIV 1/2 RESULT COMMENT: NORMAL
IMM GRANULOCYTES # BLD AUTO: 0 K/UL (ref 0–0.04)
IMM GRANULOCYTES NFR BLD AUTO: 0 % (ref 0–0.5)
LDLC SERPL CALC-MCNC: 166.2 MG/DL (ref 0–100)
LYMPHOCYTES # BLD: 1.5 K/UL (ref 0.8–3.5)
LYMPHOCYTES NFR BLD: 27 % (ref 12–49)
MCH RBC QN AUTO: 30.9 PG (ref 26–34)
MCHC RBC AUTO-ENTMCNC: 32.5 G/DL (ref 30–36.5)
MCV RBC AUTO: 95.2 FL (ref 80–99)
MONOCYTES # BLD: 0.7 K/UL (ref 0–1)
MONOCYTES NFR BLD: 12 % (ref 5–13)
NEUTS SEG # BLD: 3.3 K/UL (ref 1.8–8)
NEUTS SEG NFR BLD: 58 % (ref 32–75)
NRBC # BLD: 0 K/UL (ref 0–0.01)
NRBC BLD-RTO: 0 PER 100 WBC
PLATELET # BLD AUTO: 232 K/UL (ref 150–400)
PMV BLD AUTO: 10.4 FL (ref 8.9–12.9)
POTASSIUM SERPL-SCNC: 4.3 MMOL/L (ref 3.5–5.1)
PROT SERPL-MCNC: 7 G/DL (ref 6.4–8.2)
PSA SERPL-MCNC: 1.4 NG/ML (ref 0.01–4)
RBC # BLD AUTO: 4.82 M/UL (ref 4.1–5.7)
SODIUM SERPL-SCNC: 138 MMOL/L (ref 136–145)
TRIGL SERPL-MCNC: 169 MG/DL
VLDLC SERPL CALC-MCNC: 33.8 MG/DL
WBC # BLD AUTO: 5.6 K/UL (ref 4.1–11.1)

## 2023-09-14 RX ORDER — ATORVASTATIN CALCIUM 20 MG/1
20 TABLET, FILM COATED ORAL DAILY
Qty: 90 TABLET | Refills: 1 | Status: SHIPPED | OUTPATIENT
Start: 2023-09-14

## 2023-12-13 ENCOUNTER — NURSE ONLY (OUTPATIENT)
Facility: CLINIC | Age: 58
End: 2023-12-13
Payer: COMMERCIAL

## 2023-12-13 DIAGNOSIS — E78.00 HYPERCHOLESTEROLEMIA: Primary | ICD-10-CM

## 2023-12-13 DIAGNOSIS — E78.00 HYPERCHOLESTEROLEMIA: ICD-10-CM

## 2023-12-13 DIAGNOSIS — Z23 NEED FOR VACCINATION: Primary | ICD-10-CM

## 2023-12-13 LAB
CHOLEST SERPL-MCNC: 167 MG/DL
HDLC SERPL-MCNC: 58 MG/DL
HDLC SERPL: 2.9 (ref 0–5)
LDLC SERPL CALC-MCNC: 88.2 MG/DL (ref 0–100)
TRIGL SERPL-MCNC: 104 MG/DL
VLDLC SERPL CALC-MCNC: 20.8 MG/DL

## 2023-12-13 PROCEDURE — 90750 HZV VACC RECOMBINANT IM: CPT | Performed by: FAMILY MEDICINE

## 2023-12-13 PROCEDURE — 90471 IMMUNIZATION ADMIN: CPT | Performed by: FAMILY MEDICINE

## 2023-12-13 NOTE — PROGRESS NOTES
Chief Complaint   Patient presents with    Injections     After obtaining consent, and per orders of Dr. Danielle Bill, injection of Shingrix given in Right deltoid by Alpa Farias LPN. Patient instructed to remain in clinic for 20 minutes afterwards, and to report any adverse reaction to me immediately.

## 2024-01-07 SDOH — ECONOMIC STABILITY: HOUSING INSECURITY
IN THE LAST 12 MONTHS, WAS THERE A TIME WHEN YOU DID NOT HAVE A STEADY PLACE TO SLEEP OR SLEPT IN A SHELTER (INCLUDING NOW)?: NO

## 2024-01-07 SDOH — ECONOMIC STABILITY: INCOME INSECURITY: HOW HARD IS IT FOR YOU TO PAY FOR THE VERY BASICS LIKE FOOD, HOUSING, MEDICAL CARE, AND HEATING?: NOT HARD AT ALL

## 2024-01-07 SDOH — ECONOMIC STABILITY: FOOD INSECURITY: WITHIN THE PAST 12 MONTHS, THE FOOD YOU BOUGHT JUST DIDN'T LAST AND YOU DIDN'T HAVE MONEY TO GET MORE.: NEVER TRUE

## 2024-01-07 SDOH — ECONOMIC STABILITY: TRANSPORTATION INSECURITY
IN THE PAST 12 MONTHS, HAS LACK OF TRANSPORTATION KEPT YOU FROM MEETINGS, WORK, OR FROM GETTING THINGS NEEDED FOR DAILY LIVING?: NO

## 2024-01-07 SDOH — ECONOMIC STABILITY: FOOD INSECURITY: WITHIN THE PAST 12 MONTHS, YOU WORRIED THAT YOUR FOOD WOULD RUN OUT BEFORE YOU GOT MONEY TO BUY MORE.: NEVER TRUE

## 2024-01-10 ENCOUNTER — TELEMEDICINE (OUTPATIENT)
Facility: CLINIC | Age: 59
End: 2024-01-10
Payer: COMMERCIAL

## 2024-01-10 DIAGNOSIS — E78.00 HYPERCHOLESTEROLEMIA: Primary | ICD-10-CM

## 2024-01-10 PROCEDURE — 99213 OFFICE O/P EST LOW 20 MIN: CPT | Performed by: FAMILY MEDICINE

## 2024-01-10 NOTE — PROGRESS NOTES
Bradly Dumas, was evaluated through a synchronous (real-time) audio-video encounter. The patient (or guardian if applicable) is aware that this is a billable service, which includes applicable co-pays. This Virtual Visit was conducted with patient's (and/or legal guardian's) consent. Patient identification was verified, and a caregiver was present when appropriate.   The patient was located at Home: [address] [place] [id number]  Provider was located at Facility (Appt Dept): [address] [place] [id number]      Bradly Dumas (:  1965) is a Established patient, presenting virtually for evaluation of the following:    Assessment & Plan   Below is the assessment and plan developed based on review of pertinent history, physical exam, labs, studies, and medications.  1. Hypercholesterolemia  -     Lipid Panel; Future  Will continue with atorvastatin, blood work stable  Return in about 8 months (around 9/10/2024) for Annual Wellness.       Subjective   Patient is a 58-year-old male who is following up for cholesterol.  Patient has cholesterol drawn and his LDL was 88 down from 160.  Patient is taking his atorvastatin 20 mg with no side effects noted.  Denies any muscle aches or myalgias.  He is compliant with medication      Review of Systems   All other systems reviewed and are negative.         Objective   Patient-Reported Vitals  No data recorded     Physical Exam  [INSTRUCTIONS:  \"[x]\" Indicates a positive item  \"[]\" Indicates a negative item  -- DELETE ALL ITEMS NOT EXAMINED]    Constitutional: [x] Appears well-developed and well-nourished [x] No apparent distress      [] Abnormal -     Mental status: [x] Alert and awake  [x] Oriented to person/place/time [x] Able to follow commands    [] Abnormal -     Eyes:   EOM    [x]  Normal    [] Abnormal -   Sclera  [x]  Normal    [] Abnormal -          Discharge [x]  None visible   [] Abnormal -     HENT: [x] Normocephalic, atraumatic  [] Abnormal -   [x]

## 2024-03-18 RX ORDER — ATORVASTATIN CALCIUM 20 MG/1
20 TABLET, FILM COATED ORAL DAILY
Qty: 90 TABLET | Refills: 1 | Status: SHIPPED | OUTPATIENT
Start: 2024-03-18

## 2024-03-18 RX ORDER — RIVAROXABAN 20 MG/1
20 TABLET, FILM COATED ORAL DAILY
Qty: 90 TABLET | Refills: 1 | Status: SHIPPED | OUTPATIENT
Start: 2024-03-18

## 2024-09-13 RX ORDER — ATORVASTATIN CALCIUM 20 MG/1
20 TABLET, FILM COATED ORAL DAILY
Qty: 90 TABLET | Refills: 1 | Status: SHIPPED | OUTPATIENT
Start: 2024-09-13

## 2024-09-16 ENCOUNTER — OFFICE VISIT (OUTPATIENT)
Facility: CLINIC | Age: 59
End: 2024-09-16
Payer: COMMERCIAL

## 2024-09-16 VITALS
OXYGEN SATURATION: 97 % | BODY MASS INDEX: 31.24 KG/M2 | TEMPERATURE: 97.6 F | WEIGHT: 235.7 LBS | HEIGHT: 73 IN | RESPIRATION RATE: 20 BRPM | HEART RATE: 65 BPM | DIASTOLIC BLOOD PRESSURE: 90 MMHG | SYSTOLIC BLOOD PRESSURE: 137 MMHG

## 2024-09-16 DIAGNOSIS — E78.00 HYPERCHOLESTEROLEMIA: ICD-10-CM

## 2024-09-16 DIAGNOSIS — R03.0 ELEVATED BP WITHOUT DIAGNOSIS OF HYPERTENSION: ICD-10-CM

## 2024-09-16 DIAGNOSIS — Z12.11 SCREENING FOR COLORECTAL CANCER: ICD-10-CM

## 2024-09-16 DIAGNOSIS — Z00.00 VISIT FOR WELL MAN HEALTH CHECK: Primary | ICD-10-CM

## 2024-09-16 DIAGNOSIS — Z12.12 SCREENING FOR COLORECTAL CANCER: ICD-10-CM

## 2024-09-16 DIAGNOSIS — I26.99 OTHER ACUTE PULMONARY EMBOLISM, UNSPECIFIED WHETHER ACUTE COR PULMONALE PRESENT (HCC): ICD-10-CM

## 2024-09-16 LAB
ALBUMIN SERPL-MCNC: 3.7 G/DL (ref 3.5–5)
ALBUMIN/GLOB SERPL: 1.2 (ref 1.1–2.2)
ALP SERPL-CCNC: 67 U/L (ref 45–117)
ALT SERPL-CCNC: 22 U/L (ref 12–78)
ANION GAP SERPL CALC-SCNC: 3 MMOL/L (ref 2–12)
AST SERPL-CCNC: 14 U/L (ref 15–37)
BASOPHILS # BLD: 0 K/UL (ref 0–0.1)
BASOPHILS NFR BLD: 1 % (ref 0–1)
BILIRUB SERPL-MCNC: 0.4 MG/DL (ref 0.2–1)
BUN SERPL-MCNC: 15 MG/DL (ref 6–20)
BUN/CREAT SERPL: 13 (ref 12–20)
CALCIUM SERPL-MCNC: 9 MG/DL (ref 8.5–10.1)
CHLORIDE SERPL-SCNC: 108 MMOL/L (ref 97–108)
CHOLEST SERPL-MCNC: 160 MG/DL
CO2 SERPL-SCNC: 28 MMOL/L (ref 21–32)
CREAT SERPL-MCNC: 1.15 MG/DL (ref 0.7–1.3)
DIFFERENTIAL METHOD BLD: NORMAL
EOSINOPHIL # BLD: 0.2 K/UL (ref 0–0.4)
EOSINOPHIL NFR BLD: 3 % (ref 0–7)
ERYTHROCYTE [DISTWIDTH] IN BLOOD BY AUTOMATED COUNT: 12.5 % (ref 11.5–14.5)
GLOBULIN SER CALC-MCNC: 3.1 G/DL (ref 2–4)
GLUCOSE SERPL-MCNC: 92 MG/DL (ref 65–100)
HCT VFR BLD AUTO: 44.9 % (ref 36.6–50.3)
HDLC SERPL-MCNC: 61 MG/DL
HDLC SERPL: 2.6 (ref 0–5)
HGB BLD-MCNC: 14.6 G/DL (ref 12.1–17)
IMM GRANULOCYTES # BLD AUTO: 0 K/UL (ref 0–0.04)
IMM GRANULOCYTES NFR BLD AUTO: 0 % (ref 0–0.5)
LDLC SERPL CALC-MCNC: 80.6 MG/DL (ref 0–100)
LYMPHOCYTES # BLD: 1.2 K/UL (ref 0.8–3.5)
LYMPHOCYTES NFR BLD: 24 % (ref 12–49)
MCH RBC QN AUTO: 30.6 PG (ref 26–34)
MCHC RBC AUTO-ENTMCNC: 32.5 G/DL (ref 30–36.5)
MCV RBC AUTO: 94.1 FL (ref 80–99)
MONOCYTES # BLD: 0.5 K/UL (ref 0–1)
MONOCYTES NFR BLD: 10 % (ref 5–13)
NEUTS SEG # BLD: 3.1 K/UL (ref 1.8–8)
NEUTS SEG NFR BLD: 62 % (ref 32–75)
NRBC # BLD: 0 K/UL (ref 0–0.01)
NRBC BLD-RTO: 0 PER 100 WBC
PLATELET # BLD AUTO: 199 K/UL (ref 150–400)
PMV BLD AUTO: 10.5 FL (ref 8.9–12.9)
POTASSIUM SERPL-SCNC: 5.1 MMOL/L (ref 3.5–5.1)
PROT SERPL-MCNC: 6.8 G/DL (ref 6.4–8.2)
PSA SERPL-MCNC: 1.6 NG/ML (ref 0.01–4)
RBC # BLD AUTO: 4.77 M/UL (ref 4.1–5.7)
SODIUM SERPL-SCNC: 139 MMOL/L (ref 136–145)
TRIGL SERPL-MCNC: 92 MG/DL
VLDLC SERPL CALC-MCNC: 18.4 MG/DL
WBC # BLD AUTO: 5.1 K/UL (ref 4.1–11.1)

## 2024-09-16 PROCEDURE — 99396 PREV VISIT EST AGE 40-64: CPT | Performed by: FAMILY MEDICINE

## 2024-09-16 ASSESSMENT — PATIENT HEALTH QUESTIONNAIRE - PHQ9
SUM OF ALL RESPONSES TO PHQ QUESTIONS 1-9: 0
SUM OF ALL RESPONSES TO PHQ QUESTIONS 1-9: 0
SUM OF ALL RESPONSES TO PHQ9 QUESTIONS 1 & 2: 0
2. FEELING DOWN, DEPRESSED OR HOPELESS: NOT AT ALL
1. LITTLE INTEREST OR PLEASURE IN DOING THINGS: NOT AT ALL
SUM OF ALL RESPONSES TO PHQ QUESTIONS 1-9: 0
SUM OF ALL RESPONSES TO PHQ QUESTIONS 1-9: 0

## 2024-09-23 RX ORDER — RIVAROXABAN 20 MG/1
20 TABLET, FILM COATED ORAL DAILY
Qty: 90 TABLET | Refills: 1 | Status: SHIPPED | OUTPATIENT
Start: 2024-09-23

## 2025-03-14 SDOH — ECONOMIC STABILITY: TRANSPORTATION INSECURITY
IN THE PAST 12 MONTHS, HAS THE LACK OF TRANSPORTATION KEPT YOU FROM MEDICAL APPOINTMENTS OR FROM GETTING MEDICATIONS?: NO

## 2025-03-14 SDOH — ECONOMIC STABILITY: FOOD INSECURITY: WITHIN THE PAST 12 MONTHS, YOU WORRIED THAT YOUR FOOD WOULD RUN OUT BEFORE YOU GOT MONEY TO BUY MORE.: NEVER TRUE

## 2025-03-14 SDOH — ECONOMIC STABILITY: FOOD INSECURITY: WITHIN THE PAST 12 MONTHS, THE FOOD YOU BOUGHT JUST DIDN'T LAST AND YOU DIDN'T HAVE MONEY TO GET MORE.: NEVER TRUE

## 2025-03-14 SDOH — ECONOMIC STABILITY: INCOME INSECURITY: IN THE LAST 12 MONTHS, WAS THERE A TIME WHEN YOU WERE NOT ABLE TO PAY THE MORTGAGE OR RENT ON TIME?: NO

## 2025-03-17 ENCOUNTER — OFFICE VISIT (OUTPATIENT)
Facility: CLINIC | Age: 60
End: 2025-03-17
Payer: COMMERCIAL

## 2025-03-17 VITALS
BODY MASS INDEX: 29.55 KG/M2 | WEIGHT: 223 LBS | SYSTOLIC BLOOD PRESSURE: 129 MMHG | OXYGEN SATURATION: 99 % | HEIGHT: 73 IN | DIASTOLIC BLOOD PRESSURE: 87 MMHG | RESPIRATION RATE: 16 BRPM | TEMPERATURE: 98.2 F | HEART RATE: 73 BPM

## 2025-03-17 DIAGNOSIS — I82.4Z2 ACUTE EMBOLISM AND THROMBOSIS OF DEEP VEIN OF LEFT DISTAL LOWER EXTREMITY: ICD-10-CM

## 2025-03-17 DIAGNOSIS — E78.00 HYPERCHOLESTEROLEMIA: Primary | ICD-10-CM

## 2025-03-17 DIAGNOSIS — E78.00 HYPERCHOLESTEROLEMIA: ICD-10-CM

## 2025-03-17 DIAGNOSIS — R03.0 ELEVATED BP WITHOUT DIAGNOSIS OF HYPERTENSION: ICD-10-CM

## 2025-03-17 LAB
ALBUMIN SERPL-MCNC: 4 G/DL (ref 3.5–5)
ALBUMIN/GLOB SERPL: 1.4 (ref 1.1–2.2)
ALP SERPL-CCNC: 54 U/L (ref 45–117)
ALT SERPL-CCNC: 21 U/L (ref 12–78)
ANION GAP SERPL CALC-SCNC: 4 MMOL/L (ref 2–12)
AST SERPL-CCNC: 15 U/L (ref 15–37)
BILIRUB SERPL-MCNC: 0.6 MG/DL (ref 0.2–1)
BUN SERPL-MCNC: 24 MG/DL (ref 6–20)
BUN/CREAT SERPL: 22 (ref 12–20)
CALCIUM SERPL-MCNC: 9.2 MG/DL (ref 8.5–10.1)
CHLORIDE SERPL-SCNC: 108 MMOL/L (ref 97–108)
CHOLEST SERPL-MCNC: 150 MG/DL
CO2 SERPL-SCNC: 27 MMOL/L (ref 21–32)
CREAT SERPL-MCNC: 1.09 MG/DL (ref 0.7–1.3)
GLOBULIN SER CALC-MCNC: 2.8 G/DL (ref 2–4)
GLUCOSE SERPL-MCNC: 90 MG/DL (ref 65–100)
HDLC SERPL-MCNC: 64 MG/DL
HDLC SERPL: 2.3 (ref 0–5)
LDLC SERPL CALC-MCNC: 77 MG/DL (ref 0–100)
POTASSIUM SERPL-SCNC: 4.2 MMOL/L (ref 3.5–5.1)
PROT SERPL-MCNC: 6.8 G/DL (ref 6.4–8.2)
SODIUM SERPL-SCNC: 139 MMOL/L (ref 136–145)
TRIGL SERPL-MCNC: 45 MG/DL
VLDLC SERPL CALC-MCNC: 9 MG/DL

## 2025-03-17 PROCEDURE — 99214 OFFICE O/P EST MOD 30 MIN: CPT | Performed by: FAMILY MEDICINE

## 2025-03-17 RX ORDER — ATORVASTATIN CALCIUM 20 MG/1
20 TABLET, FILM COATED ORAL DAILY
Qty: 90 TABLET | Refills: 1 | Status: SHIPPED | OUTPATIENT
Start: 2025-03-17

## 2025-03-17 ASSESSMENT — PATIENT HEALTH QUESTIONNAIRE - PHQ9
SUM OF ALL RESPONSES TO PHQ QUESTIONS 1-9: 0
1. LITTLE INTEREST OR PLEASURE IN DOING THINGS: NOT AT ALL
SUM OF ALL RESPONSES TO PHQ QUESTIONS 1-9: 0
2. FEELING DOWN, DEPRESSED OR HOPELESS: NOT AT ALL

## 2025-03-17 NOTE — PROGRESS NOTES
Chief Complaint   Patient presents with    Hypertension     Patient is here for high blood pressure.      History of Present Illness  The patient is a 59-year-old male seen for hypercholesterolemia.    Hypercholesterolemia  He reports no current health concerns. He maintains an active lifestyle, engaging in walking and elliptical exercises for 30 minutes, 4 days a week, and weightlifting once a week. He is on medication for cholesterol management.  - Alleviating/Aggravating Factors: Active lifestyle, medication for cholesterol management.    Pulmonary Embolism  He is not experiencing any shortness of breath related to his pulmonary embolism. He is on Xarelto.  - Alleviating/Aggravating Factors: On Xarelto.    MEDICATIONS  Xarelto        Diet and Lifestyle: generally follows a low sodium diet  Home BP Monitoring: is not measured at home  Use of agents associated with hypertension:  No.  Cardiovascular ROS: taking medications as instructed, no medication side effects noted, no TIA's, no chest pain on exertion, no dyspnea on exertion, no swelling of ankles.     New concerns: none.     Reviewed and agree with Nurse Note and duplicated in this note.  Reviewed PmHx, RxHx, FmHx, SocHx, AllgHx and updated and dated in the chart.    Family History   Problem Relation Age of Onset    No Known Problems Brother     No Known Problems Mother     No Known Problems Brother     Cancer Father         Bladder    Anesth Problems Neg Hx        Past Medical History:   Diagnosis Date    Anticoagulant long-term use 2017    Arthritis     KNEE    Hemorrhoids     Nausea & vomiting     Pulmonary embolism ([facility])     Thromboembolus ([facility]) 03/2017    LT. LEG      Social History     Socioeconomic History    Marital status: Single   Tobacco Use    Smoking status: Never    Smokeless tobacco: Never   Substance and Sexual Activity    Alcohol use: Yes     Alcohol/week: 2.0 standard drinks of alcohol     Types: 2 Cans of beer per week    Drug use: No

## 2025-03-18 ENCOUNTER — RESULTS FOLLOW-UP (OUTPATIENT)
Facility: CLINIC | Age: 60
End: 2025-03-18

## 2025-03-25 RX ORDER — RIVAROXABAN 20 MG/1
20 TABLET, FILM COATED ORAL DAILY
Qty: 90 TABLET | Refills: 1 | Status: SHIPPED | OUTPATIENT
Start: 2025-03-25

## (undated) DEVICE — SOLUTION IRRIG 3000ML 0.9% SOD CHL FLX CONT 0797208] ICU MEDICAL INC]

## (undated) DEVICE — STERILE POLYISOPRENE POWDER-FREE SURGICAL GLOVES WITH EMOLLIENT COATING: Brand: PROTEXIS

## (undated) DEVICE — ZIMMER® STERILE DISPOSABLE TOURNIQUET CUFF WITH PLC, DUAL PORT, SINGLE BLADDER, 34 IN. (86 CM)

## (undated) DEVICE — CONTAINER,SPECIMEN,3OZ,OR STRL: Brand: MEDLINE

## (undated) DEVICE — PREP SKN PREVAIL 40ML APPL --

## (undated) DEVICE — REM POLYHESIVE ADULT PATIENT RETURN ELECTRODE: Brand: VALLEYLAB

## (undated) DEVICE — PADDING CST 6IN STERILE --

## (undated) DEVICE — HOOK LOCK LATEX FREE ELASTIC BANDAGE D/L 6INX10YD

## (undated) DEVICE — T4 HOOD

## (undated) DEVICE — SUTURE STRATAFIX SYMMETRIC PDS + SZ 1 L18IN ABSRB VLT L48MM SXPP1A400

## (undated) DEVICE — BLADE SAW W073XL276IN THK0031IN CUT THK0036IN REPL SAG

## (undated) DEVICE — DRAPE,EXTREMITY,89X128,STERILE: Brand: MEDLINE

## (undated) DEVICE — NEEDLE HYPO 18GA L1.5IN PNK S STL HUB POLYPR SHLD REG BVL

## (undated) DEVICE — STERILE POLYISOPRENE POWDER-FREE SURGICAL GLOVES: Brand: PROTEXIS

## (undated) DEVICE — 3M™ IOBAN™ 2 ANTIMICROBIAL INCISE DRAPE 6651EZ: Brand: IOBAN™ 2

## (undated) DEVICE — MIXING SYS CEM BNE VAC -- QUICKVAC 15/BX

## (undated) DEVICE — Device

## (undated) DEVICE — SCRUB DRY SURG EZ SCRUB BRUSH PREOPERATIVE GRN

## (undated) DEVICE — DEVON™ KNEE AND BODY STRAP 60" X 3" (1.5 M X 7.6 CM): Brand: DEVON

## (undated) DEVICE — BLADE SAW W0.49XL3.15IN THK0.047IN CUT THK0.047IN REPL SAG

## (undated) DEVICE — SUTURE VCRL SZ 0 L36IN ABSRB VLT L40MM CT 1/2 CIR J358H

## (undated) DEVICE — HANDPIECE SET WITH BONE CLEANING TIP AND SUCTION TUBE: Brand: INTERPULSE

## (undated) DEVICE — INFECTION CONTROL KIT SYS

## (undated) DEVICE — Z DISCONTINUED USE 2744636  DRESSING AQUACEL 14 IN ALG W3.5XL14IN POLYUR FLM CVR W/ HYDRCOLL

## (undated) DEVICE — SYRINGE MED 20ML STD CLR PLAS LUERLOCK TIP N CTRL DISP

## (undated) DEVICE — SUTURE VCRL SZ 2 L54IN ABSRB UD L65MM TP-1 1/2 CIR J880T

## (undated) DEVICE — TRAY CATH 16F URIN MTR LTX -- CONVERT TO ITEM 363111

## (undated) DEVICE — SUTURE VCRL SZ 2-0 L36IN ABSRB UD L40MM CT 1/2 CIR J957H